# Patient Record
Sex: MALE | Race: BLACK OR AFRICAN AMERICAN | Employment: OTHER | URBAN - METROPOLITAN AREA
[De-identification: names, ages, dates, MRNs, and addresses within clinical notes are randomized per-mention and may not be internally consistent; named-entity substitution may affect disease eponyms.]

---

## 2024-10-02 ENCOUNTER — APPOINTMENT (OUTPATIENT)
Dept: CT IMAGING | Age: 59
DRG: 177 | End: 2024-10-02
Payer: COMMERCIAL

## 2024-10-02 ENCOUNTER — APPOINTMENT (OUTPATIENT)
Dept: GENERAL RADIOLOGY | Age: 59
DRG: 177 | End: 2024-10-02
Payer: COMMERCIAL

## 2024-10-02 ENCOUNTER — HOSPITAL ENCOUNTER (INPATIENT)
Age: 59
LOS: 5 days | Discharge: HOME | DRG: 177 | End: 2024-10-07
Attending: STUDENT IN AN ORGANIZED HEALTH CARE EDUCATION/TRAINING PROGRAM | Admitting: INTERNAL MEDICINE
Payer: COMMERCIAL

## 2024-10-02 DIAGNOSIS — R07.9 CHEST PAIN, UNSPECIFIED TYPE: Primary | ICD-10-CM

## 2024-10-02 DIAGNOSIS — R79.89 ELEVATED TROPONIN: ICD-10-CM

## 2024-10-02 DIAGNOSIS — J18.9 PNEUMONIA DUE TO INFECTIOUS ORGANISM, UNSPECIFIED LATERALITY, UNSPECIFIED PART OF LUNG: ICD-10-CM

## 2024-10-02 PROBLEM — J44.1 COPD EXACERBATION (HCC): Status: ACTIVE | Noted: 2024-10-02

## 2024-10-02 LAB
ALBUMIN SERPL-MCNC: 4.2 G/DL (ref 3.4–5)
ALBUMIN/GLOB SERPL: 1.4 {RATIO} (ref 1.1–2.2)
ALP SERPL-CCNC: 96 U/L (ref 40–129)
ALT SERPL-CCNC: 19 U/L (ref 10–40)
ANION GAP SERPL CALCULATED.3IONS-SCNC: 11 MMOL/L (ref 3–16)
ANISOCYTOSIS BLD QL SMEAR: ABNORMAL
AST SERPL-CCNC: 28 U/L (ref 15–37)
BASE EXCESS BLDV CALC-SCNC: 3.6 MMOL/L
BASOPHILS # BLD: 0 K/UL (ref 0–0.2)
BASOPHILS NFR BLD: 0 %
BILIRUB SERPL-MCNC: 0.8 MG/DL (ref 0–1)
BUN SERPL-MCNC: 13 MG/DL (ref 7–20)
CALCIUM SERPL-MCNC: 9.2 MG/DL (ref 8.3–10.6)
CHLORIDE SERPL-SCNC: 101 MMOL/L (ref 99–110)
CO2 BLDV-SCNC: 31 MMOL/L
CO2 SERPL-SCNC: 26 MMOL/L (ref 21–32)
COHGB MFR BLDV: 1.8 %
CREAT SERPL-MCNC: 1 MG/DL (ref 0.9–1.3)
DEPRECATED RDW RBC AUTO: 16.2 % (ref 12.4–15.4)
EKG ATRIAL RATE: 109 BPM
EKG DIAGNOSIS: NORMAL
EKG P AXIS: 98 DEGREES
EKG P-R INTERVAL: 136 MS
EKG Q-T INTERVAL: 322 MS
EKG QRS DURATION: 86 MS
EKG QTC CALCULATION (BAZETT): 433 MS
EKG R AXIS: 112 DEGREES
EKG T AXIS: 21 DEGREES
EKG VENTRICULAR RATE: 109 BPM
EOSINOPHIL # BLD: 0 K/UL (ref 0–0.6)
EOSINOPHIL NFR BLD: 0 %
FLUAV RNA UPPER RESP QL NAA+PROBE: NEGATIVE
FLUBV AG NPH QL: NEGATIVE
GFR SERPLBLD CREATININE-BSD FMLA CKD-EPI: 88 ML/MIN/{1.73_M2}
GLUCOSE SERPL-MCNC: 98 MG/DL (ref 70–99)
HCO3 BLDV-SCNC: 30 MMOL/L (ref 23–29)
HCT VFR BLD AUTO: 44.8 % (ref 40.5–52.5)
HGB BLD-MCNC: 15.2 G/DL (ref 13.5–17.5)
LYMPHOCYTES # BLD: 0.6 K/UL (ref 1–5.1)
LYMPHOCYTES NFR BLD: 5 %
MAGNESIUM SERPL-MCNC: 2.32 MG/DL (ref 1.8–2.4)
MCH RBC QN AUTO: 28.2 PG (ref 26–34)
MCHC RBC AUTO-ENTMCNC: 34 G/DL (ref 31–36)
MCV RBC AUTO: 83 FL (ref 80–100)
METHGB MFR BLDV: 0.3 %
MONOCYTES # BLD: 1.4 K/UL (ref 0–1.3)
MONOCYTES NFR BLD: 11 %
NEUTROPHILS # BLD: 10.8 K/UL (ref 1.7–7.7)
NEUTROPHILS NFR BLD: 84 %
O2 THERAPY: ABNORMAL
OVALOCYTES BLD QL SMEAR: ABNORMAL
PCO2 BLDV: 49.1 MMHG (ref 40–50)
PH BLDV: 7.39 [PH] (ref 7.35–7.45)
PLATELET # BLD AUTO: 343 K/UL (ref 135–450)
PLATELET BLD QL SMEAR: ADEQUATE
PMV BLD AUTO: 8.8 FL (ref 5–10.5)
PO2 BLDV: 51 MMHG
POTASSIUM SERPL-SCNC: 3.9 MMOL/L (ref 3.5–5.1)
PROT SERPL-MCNC: 7.1 G/DL (ref 6.4–8.2)
RBC # BLD AUTO: 5.39 M/UL (ref 4.2–5.9)
SAO2 % BLDV: 85 %
SARS-COV-2 RDRP RESP QL NAA+PROBE: NOT DETECTED
SLIDE REVIEW: ABNORMAL
SODIUM SERPL-SCNC: 138 MMOL/L (ref 136–145)
TROPONIN, HIGH SENSITIVITY: 18 NG/L (ref 0–22)
TROPONIN, HIGH SENSITIVITY: 22 NG/L (ref 0–22)
TROPONIN, HIGH SENSITIVITY: 25 NG/L (ref 0–22)
TROPONIN, HIGH SENSITIVITY: 33 NG/L (ref 0–22)
TROPONIN, HIGH SENSITIVITY: 34 NG/L (ref 0–22)
TROPONIN, HIGH SENSITIVITY: 35 NG/L (ref 0–22)
WBC # BLD AUTO: 12.8 K/UL (ref 4–11)

## 2024-10-02 PROCEDURE — 2700000000 HC OXYGEN THERAPY PER DAY

## 2024-10-02 PROCEDURE — 6370000000 HC RX 637 (ALT 250 FOR IP): Performed by: INTERNAL MEDICINE

## 2024-10-02 PROCEDURE — 2580000003 HC RX 258: Performed by: INTERNAL MEDICINE

## 2024-10-02 PROCEDURE — 71260 CT THORAX DX C+: CPT

## 2024-10-02 PROCEDURE — 80053 COMPREHEN METABOLIC PANEL: CPT

## 2024-10-02 PROCEDURE — 6370000000 HC RX 637 (ALT 250 FOR IP): Performed by: STUDENT IN AN ORGANIZED HEALTH CARE EDUCATION/TRAINING PROGRAM

## 2024-10-02 PROCEDURE — 85025 COMPLETE CBC W/AUTO DIFF WBC: CPT

## 2024-10-02 PROCEDURE — 96365 THER/PROPH/DIAG IV INF INIT: CPT

## 2024-10-02 PROCEDURE — 93005 ELECTROCARDIOGRAM TRACING: CPT | Performed by: STUDENT IN AN ORGANIZED HEALTH CARE EDUCATION/TRAINING PROGRAM

## 2024-10-02 PROCEDURE — 71045 X-RAY EXAM CHEST 1 VIEW: CPT

## 2024-10-02 PROCEDURE — 87804 INFLUENZA ASSAY W/OPTIC: CPT

## 2024-10-02 PROCEDURE — 1200000000 HC SEMI PRIVATE

## 2024-10-02 PROCEDURE — 94640 AIRWAY INHALATION TREATMENT: CPT

## 2024-10-02 PROCEDURE — 94761 N-INVAS EAR/PLS OXIMETRY MLT: CPT

## 2024-10-02 PROCEDURE — 84484 ASSAY OF TROPONIN QUANT: CPT

## 2024-10-02 PROCEDURE — 2580000003 HC RX 258: Performed by: STUDENT IN AN ORGANIZED HEALTH CARE EDUCATION/TRAINING PROGRAM

## 2024-10-02 PROCEDURE — 2060000000 HC ICU INTERMEDIATE R&B

## 2024-10-02 PROCEDURE — 36415 COLL VENOUS BLD VENIPUNCTURE: CPT

## 2024-10-02 PROCEDURE — 83735 ASSAY OF MAGNESIUM: CPT

## 2024-10-02 PROCEDURE — 99285 EMERGENCY DEPT VISIT HI MDM: CPT

## 2024-10-02 PROCEDURE — 96375 TX/PRO/DX INJ NEW DRUG ADDON: CPT

## 2024-10-02 PROCEDURE — 6360000002 HC RX W HCPCS: Performed by: INTERNAL MEDICINE

## 2024-10-02 PROCEDURE — 6360000004 HC RX CONTRAST MEDICATION: Performed by: STUDENT IN AN ORGANIZED HEALTH CARE EDUCATION/TRAINING PROGRAM

## 2024-10-02 PROCEDURE — 93010 ELECTROCARDIOGRAM REPORT: CPT | Performed by: INTERNAL MEDICINE

## 2024-10-02 PROCEDURE — 6360000002 HC RX W HCPCS: Performed by: STUDENT IN AN ORGANIZED HEALTH CARE EDUCATION/TRAINING PROGRAM

## 2024-10-02 PROCEDURE — 82803 BLOOD GASES ANY COMBINATION: CPT

## 2024-10-02 PROCEDURE — 87635 SARS-COV-2 COVID-19 AMP PRB: CPT

## 2024-10-02 RX ORDER — BENZONATATE 100 MG/1
100 CAPSULE ORAL 3 TIMES DAILY PRN
Status: DISCONTINUED | OUTPATIENT
Start: 2024-10-02 | End: 2024-10-07 | Stop reason: HOSPADM

## 2024-10-02 RX ORDER — LOSARTAN POTASSIUM 25 MG/1
25 TABLET ORAL DAILY
COMMUNITY
Start: 2024-01-25

## 2024-10-02 RX ORDER — BUDESONIDE AND FORMOTEROL FUMARATE DIHYDRATE 160; 4.5 UG/1; UG/1
2 AEROSOL RESPIRATORY (INHALATION) 2 TIMES DAILY
COMMUNITY
Start: 2024-06-24

## 2024-10-02 RX ORDER — BUDESONIDE 0.25 MG/2ML
250 INHALANT ORAL
COMMUNITY
Start: 2024-07-29

## 2024-10-02 RX ORDER — ASPIRIN 81 MG/1
81 TABLET ORAL DAILY
COMMUNITY

## 2024-10-02 RX ORDER — ONDANSETRON 2 MG/ML
4 INJECTION INTRAMUSCULAR; INTRAVENOUS EVERY 6 HOURS PRN
Status: DISCONTINUED | OUTPATIENT
Start: 2024-10-02 | End: 2024-10-07 | Stop reason: HOSPADM

## 2024-10-02 RX ORDER — ALBUTEROL SULFATE 90 UG/1
2 INHALANT RESPIRATORY (INHALATION) EVERY 6 HOURS PRN
COMMUNITY
Start: 2024-08-12

## 2024-10-02 RX ORDER — METHYLPREDNISOLONE SODIUM SUCCINATE 40 MG/ML
40 INJECTION, POWDER, LYOPHILIZED, FOR SOLUTION INTRAMUSCULAR; INTRAVENOUS EVERY 6 HOURS
Status: COMPLETED | OUTPATIENT
Start: 2024-10-02 | End: 2024-10-04

## 2024-10-02 RX ORDER — SODIUM CHLORIDE 0.9 % (FLUSH) 0.9 %
5-40 SYRINGE (ML) INJECTION EVERY 12 HOURS SCHEDULED
Status: DISCONTINUED | OUTPATIENT
Start: 2024-10-02 | End: 2024-10-07 | Stop reason: HOSPADM

## 2024-10-02 RX ORDER — SODIUM CHLORIDE 9 MG/ML
INJECTION, SOLUTION INTRAVENOUS PRN
Status: DISCONTINUED | OUTPATIENT
Start: 2024-10-02 | End: 2024-10-07 | Stop reason: HOSPADM

## 2024-10-02 RX ORDER — ONDANSETRON 4 MG/1
4 TABLET, ORALLY DISINTEGRATING ORAL EVERY 8 HOURS PRN
Status: DISCONTINUED | OUTPATIENT
Start: 2024-10-02 | End: 2024-10-07 | Stop reason: HOSPADM

## 2024-10-02 RX ORDER — CETIRIZINE HYDROCHLORIDE 10 MG/1
10 TABLET ORAL DAILY
COMMUNITY

## 2024-10-02 RX ORDER — ACETAMINOPHEN 650 MG/1
650 SUPPOSITORY RECTAL EVERY 6 HOURS PRN
Status: DISCONTINUED | OUTPATIENT
Start: 2024-10-02 | End: 2024-10-07 | Stop reason: HOSPADM

## 2024-10-02 RX ORDER — ONDANSETRON 2 MG/ML
4 INJECTION INTRAMUSCULAR; INTRAVENOUS ONCE
Status: COMPLETED | OUTPATIENT
Start: 2024-10-02 | End: 2024-10-02

## 2024-10-02 RX ORDER — IOPAMIDOL 755 MG/ML
75 INJECTION, SOLUTION INTRAVASCULAR
Status: COMPLETED | OUTPATIENT
Start: 2024-10-02 | End: 2024-10-02

## 2024-10-02 RX ORDER — ACLIDINIUM BROMIDE 400 UG/1
1 POWDER, METERED RESPIRATORY (INHALATION)
COMMUNITY

## 2024-10-02 RX ORDER — ACETAMINOPHEN 325 MG/1
650 TABLET ORAL EVERY 6 HOURS PRN
Status: DISCONTINUED | OUTPATIENT
Start: 2024-10-02 | End: 2024-10-07 | Stop reason: HOSPADM

## 2024-10-02 RX ORDER — AMLODIPINE BESYLATE 10 MG/1
10 TABLET ORAL DAILY
COMMUNITY

## 2024-10-02 RX ORDER — POLYETHYLENE GLYCOL 3350 17 G/17G
17 POWDER, FOR SOLUTION ORAL DAILY PRN
Status: DISCONTINUED | OUTPATIENT
Start: 2024-10-02 | End: 2024-10-07 | Stop reason: HOSPADM

## 2024-10-02 RX ORDER — LEVOTHYROXINE SODIUM 175 UG/1
1 TABLET ORAL DAILY
COMMUNITY
Start: 2024-08-19

## 2024-10-02 RX ORDER — BUDESONIDE AND FORMOTEROL FUMARATE DIHYDRATE 160; 4.5 UG/1; UG/1
2 AEROSOL RESPIRATORY (INHALATION)
Status: DISCONTINUED | OUTPATIENT
Start: 2024-10-02 | End: 2024-10-07 | Stop reason: HOSPADM

## 2024-10-02 RX ORDER — GUAIFENESIN/DEXTROMETHORPHAN 100-10MG/5
5 SYRUP ORAL EVERY 4 HOURS PRN
Status: DISCONTINUED | OUTPATIENT
Start: 2024-10-02 | End: 2024-10-07 | Stop reason: HOSPADM

## 2024-10-02 RX ORDER — IBUPROFEN 800 MG/1
800 TABLET, FILM COATED ORAL EVERY 8 HOURS PRN
COMMUNITY
Start: 2024-07-01

## 2024-10-02 RX ORDER — CYCLOBENZAPRINE HCL 10 MG
10 TABLET ORAL 3 TIMES DAILY PRN
COMMUNITY

## 2024-10-02 RX ORDER — ASPIRIN 81 MG/1
324 TABLET, CHEWABLE ORAL ONCE
Status: COMPLETED | OUTPATIENT
Start: 2024-10-02 | End: 2024-10-02

## 2024-10-02 RX ORDER — ENOXAPARIN SODIUM 100 MG/ML
30 INJECTION SUBCUTANEOUS 2 TIMES DAILY
Status: DISCONTINUED | OUTPATIENT
Start: 2024-10-02 | End: 2024-10-07 | Stop reason: HOSPADM

## 2024-10-02 RX ORDER — AZITHROMYCIN 500 MG/1
500 TABLET, FILM COATED ORAL DAILY
Status: DISCONTINUED | OUTPATIENT
Start: 2024-10-03 | End: 2024-10-04

## 2024-10-02 RX ORDER — DIPHENHYDRAMINE HYDROCHLORIDE 50 MG/ML
50 INJECTION INTRAMUSCULAR; INTRAVENOUS ONCE
Status: COMPLETED | OUTPATIENT
Start: 2024-10-02 | End: 2024-10-02

## 2024-10-02 RX ORDER — PREDNISONE 20 MG/1
60 TABLET ORAL ONCE
Status: COMPLETED | OUTPATIENT
Start: 2024-10-02 | End: 2024-10-02

## 2024-10-02 RX ORDER — IPRATROPIUM BROMIDE AND ALBUTEROL SULFATE 2.5; .5 MG/3ML; MG/3ML
3 SOLUTION RESPIRATORY (INHALATION)
Status: COMPLETED | OUTPATIENT
Start: 2024-10-02 | End: 2024-10-02

## 2024-10-02 RX ORDER — ESCITALOPRAM OXALATE 10 MG/1
10 TABLET ORAL DAILY
COMMUNITY
Start: 2024-06-04

## 2024-10-02 RX ORDER — SODIUM CHLORIDE 0.9 % (FLUSH) 0.9 %
5-40 SYRINGE (ML) INJECTION PRN
Status: DISCONTINUED | OUTPATIENT
Start: 2024-10-02 | End: 2024-10-07 | Stop reason: HOSPADM

## 2024-10-02 RX ORDER — IPRATROPIUM BROMIDE AND ALBUTEROL SULFATE 2.5; .5 MG/3ML; MG/3ML
1 SOLUTION RESPIRATORY (INHALATION)
Status: DISCONTINUED | OUTPATIENT
Start: 2024-10-02 | End: 2024-10-07 | Stop reason: HOSPADM

## 2024-10-02 RX ORDER — PREDNISONE 20 MG/1
40 TABLET ORAL DAILY
Status: COMPLETED | OUTPATIENT
Start: 2024-10-05 | End: 2024-10-07

## 2024-10-02 RX ADMIN — IPRATROPIUM BROMIDE AND ALBUTEROL SULFATE 3 DOSE: .5; 3 SOLUTION RESPIRATORY (INHALATION) at 10:14

## 2024-10-02 RX ADMIN — AZITHROMYCIN MONOHYDRATE 500 MG: 500 INJECTION, POWDER, LYOPHILIZED, FOR SOLUTION INTRAVENOUS at 13:09

## 2024-10-02 RX ADMIN — IPRATROPIUM BROMIDE AND ALBUTEROL SULFATE 1 DOSE: 2.5; .5 SOLUTION RESPIRATORY (INHALATION) at 20:26

## 2024-10-02 RX ADMIN — PREDNISONE 60 MG: 20 TABLET ORAL at 10:18

## 2024-10-02 RX ADMIN — METHYLPREDNISOLONE SODIUM SUCCINATE 40 MG: 40 INJECTION INTRAMUSCULAR; INTRAVENOUS at 20:30

## 2024-10-02 RX ADMIN — ENOXAPARIN SODIUM 30 MG: 100 INJECTION SUBCUTANEOUS at 20:35

## 2024-10-02 RX ADMIN — DIPHENHYDRAMINE HYDROCHLORIDE 50 MG: 50 INJECTION INTRAMUSCULAR; INTRAVENOUS at 11:06

## 2024-10-02 RX ADMIN — WATER 1000 MG: 1 INJECTION INTRAMUSCULAR; INTRAVENOUS; SUBCUTANEOUS at 13:08

## 2024-10-02 RX ADMIN — SODIUM CHLORIDE, PRESERVATIVE FREE 10 ML: 5 INJECTION INTRAVENOUS at 20:36

## 2024-10-02 RX ADMIN — ASPIRIN 324 MG: 81 TABLET, CHEWABLE ORAL at 10:54

## 2024-10-02 RX ADMIN — ONDANSETRON 4 MG: 2 INJECTION INTRAMUSCULAR; INTRAVENOUS at 13:35

## 2024-10-02 RX ADMIN — Medication 2 PUFF: at 20:33

## 2024-10-02 RX ADMIN — IOPAMIDOL 75 ML: 755 INJECTION, SOLUTION INTRAVENOUS at 11:25

## 2024-10-02 ASSESSMENT — LIFESTYLE VARIABLES
HOW OFTEN DO YOU HAVE A DRINK CONTAINING ALCOHOL: NEVER
HOW MANY STANDARD DRINKS CONTAINING ALCOHOL DO YOU HAVE ON A TYPICAL DAY: PATIENT DOES NOT DRINK

## 2024-10-02 ASSESSMENT — PAIN SCALES - GENERAL: PAINLEVEL_OUTOF10: 6

## 2024-10-02 ASSESSMENT — PAIN - FUNCTIONAL ASSESSMENT: PAIN_FUNCTIONAL_ASSESSMENT: 0-10

## 2024-10-02 ASSESSMENT — PAIN DESCRIPTION - LOCATION: LOCATION: CHEST

## 2024-10-02 ASSESSMENT — PAIN DESCRIPTION - DESCRIPTORS: DESCRIPTORS: TIGHTNESS

## 2024-10-02 NOTE — PROGRESS NOTES
Patient arrived from ED to PCU rm [id number]. VSS on 2LNC, he is A&Ox4. Call light and belongings within reach. Safety precaution in place. Skin assessment completed.     Electronically signed by Mana Baumann RN on 10/2/2024 at 6:25 PM

## 2024-10-02 NOTE — PROGRESS NOTES
4 Eyes Skin Assessment     NAME:  Serg Burnette  YOB: 1965  MEDICAL RECORD NUMBER:  [id number]    The patient is being assessed for  Admission    I agree that at least one RN has performed a thorough Head to Toe Skin Assessment on the patient. ALL assessment sites listed below have been assessed.      Areas assessed by both nurses:    Head, Face, Ears, Shoulders, Back, Chest, Arms, Elbows, Hands, Sacrum. Buttock, Coccyx, Ischium, Legs. Feet and Heels, and Under Medical Devices         Does the Patient have a Wound? No noted wound(s)       Gopi Prevention initiated by RN: No  Wound Care Orders initiated by RN: No    Pressure Injury (Stage 3,4, Unstageable, DTI, NWPT, and Complex wounds) if present, place Wound referral order by RN under : No    New Ostomies, if present place, Ostomy referral order under : No     Nurse 1 eSignature: Electronically signed by Mana Baumann RN on 10/2/24 at 6:26 PM EDT    **SHARE this note so that the co-signing nurse can place an eSignature**    Nurse 2 eSignature: Electronically signed by Odette Sandoval RN on 10/2/24 at 6:47 PM EDT

## 2024-10-02 NOTE — PROGRESS NOTES
Medication Reconciliation    List of medications patient is currently taking is complete.     Source of information: 1. Conversation with patient at bedside                                      2. EPIC records      Notes regarding home medications:   1. Patient reports taking only levothyroxine this morning PTA      Andrea Brady [facility]   10/2/2024  1:38 PM

## 2024-10-02 NOTE — PROGRESS NOTES
Clinical Pharmacy Note  Ceftriaxone Dose Adjustment    Serg Burnette is receiving ceftriaxone for COPD. The dose has been adjusted to 2gm daily per protocol,     Wt Readings from Last 1 Encounters:   10/02/24 105.7 kg (233 lb 0.4 oz)         Ceftriaxone Empiric Dosing Table    Indication Weight < 100 kg** Weight >= 100 kg*   Bacteremia      Non-pneumococcal      Pneumococcal     2 g daily  2 g Q12H    2 g daily  2 g Q12H   Community Acquired PNA      Non- critically ill      Critically ill   1 g daily  2 g daily   2 g daily  2 g daily   CNS Infection 2 g Q12H 2 g Q12H   COPD exacerbation 1 g daily 2 g daily   Diabetic Foot Infection or SSTI 1 g daily 2 g daily   Endocarditis      Enterococcus faecalis (in         combination with ampicillin)        Strep sp., gram-negative         organisms     2 g Q12H      2 g daily   2 g Q12H      2 g daily   Intra-abdominal infection 1 g daily 2 g daily   Osteomyelitis  Discitis  Prosthetic Joint infection    Septic arthritis 2 g daily 2 g daily   Urinary Tract Infection     Uncomplicated     Complicated   1 g daily  2 g daily   2 g daily  2 g daily     Ericka Clemens, [facility], 10/2/2024 6:10 PM

## 2024-10-03 ENCOUNTER — APPOINTMENT (OUTPATIENT)
Age: 59
DRG: 177 | End: 2024-10-03
Attending: INTERNAL MEDICINE
Payer: COMMERCIAL

## 2024-10-03 PROBLEM — R07.9 CHEST PAIN: Status: ACTIVE | Noted: 2024-10-03

## 2024-10-03 LAB
ANION GAP SERPL CALCULATED.3IONS-SCNC: 8 MMOL/L (ref 3–16)
BASOPHILS # BLD: 0 K/UL (ref 0–0.2)
BASOPHILS NFR BLD: 0.2 %
BUN SERPL-MCNC: 16 MG/DL (ref 7–20)
CALCIUM SERPL-MCNC: 8.2 MG/DL (ref 8.3–10.6)
CHLORIDE SERPL-SCNC: 99 MMOL/L (ref 99–110)
CO2 SERPL-SCNC: 29 MMOL/L (ref 21–32)
CREAT SERPL-MCNC: 1 MG/DL (ref 0.9–1.3)
DEPRECATED RDW RBC AUTO: 15.9 % (ref 12.4–15.4)
ECHO AO ROOT DIAM: 2.7 CM
ECHO AO ROOT INDEX: 1.26 CM/M2
ECHO BSA: 2.22 M2
ECHO LA DIAMETER INDEX: 1.53 CM/M2
ECHO LA DIAMETER: 3.3 CM
ECHO LA TO AORTIC ROOT RATIO: 1.22
ECHO LV EDV A2C: 95 ML
ECHO LV EDV A4C: 92 ML
ECHO LV EDV INDEX A4C: 43 ML/M2
ECHO LV EDV NDEX A2C: 44 ML/M2
ECHO LV EF PHYSICIAN: 70 %
ECHO LV EJECTION FRACTION A2C: 77 %
ECHO LV EJECTION FRACTION A4C: 61 %
ECHO LV EJECTION FRACTION BIPLANE: 69 % (ref 55–100)
ECHO LV ESV A2C: 22 ML
ECHO LV ESV A4C: 36 ML
ECHO LV ESV INDEX A2C: 10 ML/M2
ECHO LV ESV INDEX A4C: 17 ML/M2
ECHO LV FRACTIONAL SHORTENING: 55 % (ref 28–44)
ECHO LV INTERNAL DIMENSION DIASTOLE INDEX: 2.47 CM/M2
ECHO LV INTERNAL DIMENSION DIASTOLIC: 5.3 CM (ref 4.2–5.9)
ECHO LV INTERNAL DIMENSION SYSTOLIC INDEX: 1.12 CM/M2
ECHO LV INTERNAL DIMENSION SYSTOLIC: 2.4 CM
ECHO LV IVSD: 0.8 CM (ref 0.6–1)
ECHO LV MASS 2D: 162.1 G (ref 88–224)
ECHO LV MASS INDEX 2D: 75.4 G/M2 (ref 49–115)
ECHO LV POSTERIOR WALL DIASTOLIC: 0.9 CM (ref 0.6–1)
ECHO LV RELATIVE WALL THICKNESS RATIO: 0.34
ECHO RV BASAL DIMENSION: 3.4 CM
ECHO RV INTERNAL DIMENSION: 3 CM
ECHO RV TAPSE: 3.3 CM (ref 1.7–?)
EKG ATRIAL RATE: 76 BPM
EKG DIAGNOSIS: NORMAL
EKG P AXIS: 109 DEGREES
EKG P-R INTERVAL: 152 MS
EKG Q-T INTERVAL: 396 MS
EKG QRS DURATION: 92 MS
EKG QTC CALCULATION (BAZETT): 445 MS
EKG R AXIS: 97 DEGREES
EKG T AXIS: 51 DEGREES
EKG VENTRICULAR RATE: 76 BPM
EOSINOPHIL # BLD: 0 K/UL (ref 0–0.6)
EOSINOPHIL NFR BLD: 0 %
GFR SERPLBLD CREATININE-BSD FMLA CKD-EPI: 88 ML/MIN/{1.73_M2}
GLUCOSE SERPL-MCNC: 148 MG/DL (ref 70–99)
HCT VFR BLD AUTO: 43.6 % (ref 40.5–52.5)
HGB BLD-MCNC: 13.8 G/DL (ref 13.5–17.5)
LYMPHOCYTES # BLD: 0.7 K/UL (ref 1–5.1)
LYMPHOCYTES NFR BLD: 5.9 %
MCH RBC QN AUTO: 27 PG (ref 26–34)
MCHC RBC AUTO-ENTMCNC: 31.8 G/DL (ref 31–36)
MCV RBC AUTO: 85.1 FL (ref 80–100)
MONOCYTES # BLD: 0.5 K/UL (ref 0–1.3)
MONOCYTES NFR BLD: 4.1 %
NEUTROPHILS # BLD: 10.6 K/UL (ref 1.7–7.7)
NEUTROPHILS NFR BLD: 89.8 %
PHOSPHATE SERPL-MCNC: 4.5 MG/DL (ref 2.5–4.9)
PLATELET # BLD AUTO: 321 K/UL (ref 135–450)
PLATELET BLD QL SMEAR: ADEQUATE
PMV BLD AUTO: 8.5 FL (ref 5–10.5)
POTASSIUM SERPL-SCNC: 4.9 MMOL/L (ref 3.5–5.1)
PROCALCITONIN SERPL IA-MCNC: 0.13 NG/ML (ref 0–0.15)
RBC # BLD AUTO: 5.12 M/UL (ref 4.2–5.9)
SLIDE REVIEW: ABNORMAL
SODIUM SERPL-SCNC: 136 MMOL/L (ref 136–145)
TROPONIN, HIGH SENSITIVITY: 16 NG/L (ref 0–22)
WBC # BLD AUTO: 11.8 K/UL (ref 4–11)

## 2024-10-03 PROCEDURE — 94640 AIRWAY INHALATION TREATMENT: CPT

## 2024-10-03 PROCEDURE — 1200000000 HC SEMI PRIVATE

## 2024-10-03 PROCEDURE — 93005 ELECTROCARDIOGRAM TRACING: CPT | Performed by: INTERNAL MEDICINE

## 2024-10-03 PROCEDURE — 84145 PROCALCITONIN (PCT): CPT

## 2024-10-03 PROCEDURE — 97165 OT EVAL LOW COMPLEX 30 MIN: CPT

## 2024-10-03 PROCEDURE — 84484 ASSAY OF TROPONIN QUANT: CPT

## 2024-10-03 PROCEDURE — 6370000000 HC RX 637 (ALT 250 FOR IP): Performed by: INTERNAL MEDICINE

## 2024-10-03 PROCEDURE — 84100 ASSAY OF PHOSPHORUS: CPT

## 2024-10-03 PROCEDURE — 93325 DOPPLER ECHO COLOR FLOW MAPG: CPT | Performed by: INTERNAL MEDICINE

## 2024-10-03 PROCEDURE — 99223 1ST HOSP IP/OBS HIGH 75: CPT | Performed by: INTERNAL MEDICINE

## 2024-10-03 PROCEDURE — 85025 COMPLETE CBC W/AUTO DIFF WBC: CPT

## 2024-10-03 PROCEDURE — 2060000000 HC ICU INTERMEDIATE R&B

## 2024-10-03 PROCEDURE — 97535 SELF CARE MNGMENT TRAINING: CPT

## 2024-10-03 PROCEDURE — 97161 PT EVAL LOW COMPLEX 20 MIN: CPT

## 2024-10-03 PROCEDURE — 2580000003 HC RX 258

## 2024-10-03 PROCEDURE — 93308 TTE F-UP OR LMTD: CPT | Performed by: INTERNAL MEDICINE

## 2024-10-03 PROCEDURE — 93010 ELECTROCARDIOGRAM REPORT: CPT | Performed by: INTERNAL MEDICINE

## 2024-10-03 PROCEDURE — 36415 COLL VENOUS BLD VENIPUNCTURE: CPT

## 2024-10-03 PROCEDURE — 99222 1ST HOSP IP/OBS MODERATE 55: CPT | Performed by: INTERNAL MEDICINE

## 2024-10-03 PROCEDURE — 6360000002 HC RX W HCPCS: Performed by: INTERNAL MEDICINE

## 2024-10-03 PROCEDURE — 97530 THERAPEUTIC ACTIVITIES: CPT

## 2024-10-03 PROCEDURE — 9990000010 HC NO CHARGE VISIT

## 2024-10-03 PROCEDURE — 2700000000 HC OXYGEN THERAPY PER DAY

## 2024-10-03 PROCEDURE — 93308 TTE F-UP OR LMTD: CPT

## 2024-10-03 PROCEDURE — 80048 BASIC METABOLIC PNL TOTAL CA: CPT

## 2024-10-03 PROCEDURE — 2580000003 HC RX 258: Performed by: INTERNAL MEDICINE

## 2024-10-03 PROCEDURE — 94761 N-INVAS EAR/PLS OXIMETRY MLT: CPT

## 2024-10-03 RX ORDER — LOSARTAN POTASSIUM 25 MG/1
25 TABLET ORAL DAILY
Status: DISCONTINUED | OUTPATIENT
Start: 2024-10-03 | End: 2024-10-07 | Stop reason: HOSPADM

## 2024-10-03 RX ORDER — BUDESONIDE AND FORMOTEROL FUMARATE DIHYDRATE 160; 4.5 UG/1; UG/1
2 AEROSOL RESPIRATORY (INHALATION) 2 TIMES DAILY
Status: DISCONTINUED | OUTPATIENT
Start: 2024-10-03 | End: 2024-10-03 | Stop reason: SDUPTHER

## 2024-10-03 RX ORDER — ESCITALOPRAM OXALATE 10 MG/1
10 TABLET ORAL DAILY
Status: DISCONTINUED | OUTPATIENT
Start: 2024-10-03 | End: 2024-10-07 | Stop reason: HOSPADM

## 2024-10-03 RX ORDER — ALBUTEROL SULFATE 90 UG/1
2 INHALANT RESPIRATORY (INHALATION) EVERY 4 HOURS PRN
Status: DISCONTINUED | OUTPATIENT
Start: 2024-10-03 | End: 2024-10-07 | Stop reason: HOSPADM

## 2024-10-03 RX ORDER — WATER 10 ML/10ML
INJECTION INTRAMUSCULAR; INTRAVENOUS; SUBCUTANEOUS
Status: COMPLETED
Start: 2024-10-03 | End: 2024-10-03

## 2024-10-03 RX ORDER — PANTOPRAZOLE SODIUM 40 MG/1
40 TABLET, DELAYED RELEASE ORAL
Status: DISCONTINUED | OUTPATIENT
Start: 2024-10-04 | End: 2024-10-07 | Stop reason: HOSPADM

## 2024-10-03 RX ORDER — CYCLOBENZAPRINE HCL 10 MG
10 TABLET ORAL 3 TIMES DAILY PRN
Status: DISCONTINUED | OUTPATIENT
Start: 2024-10-03 | End: 2024-10-07 | Stop reason: HOSPADM

## 2024-10-03 RX ORDER — ASPIRIN 81 MG/1
81 TABLET ORAL DAILY
Status: DISCONTINUED | OUTPATIENT
Start: 2024-10-03 | End: 2024-10-07 | Stop reason: HOSPADM

## 2024-10-03 RX ORDER — ALBUTEROL SULFATE 90 UG/1
2 INHALANT RESPIRATORY (INHALATION) EVERY 6 HOURS PRN
Status: DISCONTINUED | OUTPATIENT
Start: 2024-10-03 | End: 2024-10-07 | Stop reason: HOSPADM

## 2024-10-03 RX ORDER — AMLODIPINE BESYLATE 10 MG/1
10 TABLET ORAL DAILY
Status: DISCONTINUED | OUTPATIENT
Start: 2024-10-03 | End: 2024-10-07 | Stop reason: HOSPADM

## 2024-10-03 RX ORDER — CETIRIZINE HYDROCHLORIDE 10 MG/1
10 TABLET ORAL DAILY
Status: DISCONTINUED | OUTPATIENT
Start: 2024-10-03 | End: 2024-10-07 | Stop reason: HOSPADM

## 2024-10-03 RX ORDER — BUDESONIDE 0.25 MG/2ML
250 INHALANT ORAL
Status: DISCONTINUED | OUTPATIENT
Start: 2024-10-03 | End: 2024-10-03

## 2024-10-03 RX ADMIN — METHYLPREDNISOLONE SODIUM SUCCINATE 40 MG: 40 INJECTION INTRAMUSCULAR; INTRAVENOUS at 03:44

## 2024-10-03 RX ADMIN — IPRATROPIUM BROMIDE AND ALBUTEROL SULFATE 1 DOSE: 2.5; .5 SOLUTION RESPIRATORY (INHALATION) at 11:58

## 2024-10-03 RX ADMIN — Medication 2 PUFF: at 20:29

## 2024-10-03 RX ADMIN — CEFTRIAXONE SODIUM 2000 MG: 2 INJECTION, POWDER, FOR SOLUTION INTRAMUSCULAR; INTRAVENOUS at 14:47

## 2024-10-03 RX ADMIN — SODIUM CHLORIDE, PRESERVATIVE FREE 10 ML: 5 INJECTION INTRAVENOUS at 08:10

## 2024-10-03 RX ADMIN — CETIRIZINE HYDROCHLORIDE 10 MG: 10 TABLET, FILM COATED ORAL at 10:07

## 2024-10-03 RX ADMIN — ENOXAPARIN SODIUM 30 MG: 100 INJECTION SUBCUTANEOUS at 08:12

## 2024-10-03 RX ADMIN — WATER 1 ML: 1 INJECTION INTRAMUSCULAR; INTRAVENOUS; SUBCUTANEOUS at 08:10

## 2024-10-03 RX ADMIN — METHYLPREDNISOLONE SODIUM SUCCINATE 40 MG: 40 INJECTION INTRAMUSCULAR; INTRAVENOUS at 19:48

## 2024-10-03 RX ADMIN — LEVOTHYROXINE SODIUM 175 MCG: 0.12 TABLET ORAL at 10:07

## 2024-10-03 RX ADMIN — LOSARTAN POTASSIUM 25 MG: 25 TABLET, FILM COATED ORAL at 10:07

## 2024-10-03 RX ADMIN — METHYLPREDNISOLONE SODIUM SUCCINATE 40 MG: 40 INJECTION INTRAMUSCULAR; INTRAVENOUS at 14:28

## 2024-10-03 RX ADMIN — METHYLPREDNISOLONE SODIUM SUCCINATE 40 MG: 40 INJECTION INTRAMUSCULAR; INTRAVENOUS at 08:09

## 2024-10-03 RX ADMIN — IPRATROPIUM BROMIDE AND ALBUTEROL SULFATE 1 DOSE: 2.5; .5 SOLUTION RESPIRATORY (INHALATION) at 15:00

## 2024-10-03 RX ADMIN — Medication 2 PUFF: at 08:15

## 2024-10-03 RX ADMIN — ENOXAPARIN SODIUM 30 MG: 100 INJECTION SUBCUTANEOUS at 21:37

## 2024-10-03 RX ADMIN — IPRATROPIUM BROMIDE AND ALBUTEROL SULFATE 1 DOSE: 2.5; .5 SOLUTION RESPIRATORY (INHALATION) at 08:15

## 2024-10-03 RX ADMIN — SODIUM CHLORIDE, PRESERVATIVE FREE 10 ML: 5 INJECTION INTRAVENOUS at 19:50

## 2024-10-03 RX ADMIN — ASPIRIN 81 MG: 81 TABLET, COATED ORAL at 10:07

## 2024-10-03 RX ADMIN — AZITHROMYCIN 500 MG: 500 TABLET, FILM COATED ORAL at 08:07

## 2024-10-03 RX ADMIN — CYCLOBENZAPRINE 10 MG: 10 TABLET, FILM COATED ORAL at 10:12

## 2024-10-03 RX ADMIN — AMLODIPINE BESYLATE 10 MG: 10 TABLET ORAL at 10:07

## 2024-10-03 RX ADMIN — ALBUTEROL SULFATE 2 PUFF: 90 AEROSOL, METERED RESPIRATORY (INHALATION) at 20:29

## 2024-10-03 RX ADMIN — CYCLOBENZAPRINE 10 MG: 10 TABLET, FILM COATED ORAL at 21:40

## 2024-10-03 RX ADMIN — POLYETHYLENE GLYCOL 3350 17 G: 17 POWDER, FOR SOLUTION ORAL at 19:48

## 2024-10-03 RX ADMIN — TIOTROPIUM BROMIDE INHALATION SPRAY 2 PUFF: 3.12 SPRAY, METERED RESPIRATORY (INHALATION) at 11:59

## 2024-10-03 ASSESSMENT — PAIN DESCRIPTION - LOCATION: LOCATION: BACK

## 2024-10-03 ASSESSMENT — PAIN SCALES - GENERAL
PAINLEVEL_OUTOF10: 1
PAINLEVEL_OUTOF10: 7
PAINLEVEL_OUTOF10: 0

## 2024-10-03 ASSESSMENT — PAIN DESCRIPTION - DESCRIPTORS: DESCRIPTORS: ACHING;TIGHTNESS

## 2024-10-03 ASSESSMENT — PAIN DESCRIPTION - ORIENTATION: ORIENTATION: LOWER

## 2024-10-03 NOTE — PROGRESS NOTES
Comprehensive Nutrition Assessment    Type and Reason for Visit:  Initial, Positive Nutrition Screen (weight loss, decreased appetite)    Nutrition Recommendations/Plan:   Regular diet  Ensure with meals-patient willing to try  Will monitor nutritional adequacy, nutrition-related labs, weights, BMs, and clinical progress      Malnutrition Assessment:  Malnutrition Status:  No malnutrition (10/03/24 1317)    Context:  Acute Illness     Findings of the 6 clinical characteristics of malnutrition:  Energy Intake:  Mild decrease in energy intake (Comment)  Weight Loss:  No significant weight loss     Body Fat Loss:  No significant body fat loss     Muscle Mass Loss:  No significant muscle mass loss    Fluid Accumulation:  Unable to assess     Strength:  Not Performed    Nutrition Assessment:    Patient admitted with COPD exacerbation, on 1 liter nasal cannula.  Currently on a regular diet.  Appetite fair.  Agreeable to Ensure protein shakes with meals to prevent nutrition decline.  Weight at home had been fluctuating per patient.    Nutrition Related Findings:    wife does the cooking at home; no chewing or swallowing problems Wound Type: None       Current Nutrition Intake & Therapies:    Average Meal Intake: 51-75%, 26-50%  Average Supplements Intake: Unable to assess  ADULT DIET; Regular  ADULT ORAL NUTRITION SUPPLEMENT; Breakfast, Lunch, Dinner; Standard High Calorie/High Protein Oral Supplement    Anthropometric Measures:  Height: 167.6 cm (5' 6\")  Ideal Body Weight (IBW): 142 lbs (65 kg)       Current Body Weight: 107.3 kg (236 lb 8.9 oz),   IBW. Weight Source: Bed Scale  Current BMI (kg/m2): 38.2                          BMI Categories: Obese Class 2 (BMI 35.0 -39.9)    Estimated Daily Nutrient Needs:  Energy Requirements Based On: Kcal/kg  Weight Used for Energy Requirements: Ideal  Energy (kcal/day): 8066-3338 (28-30 kcal/64.5 kg)     Protein (g/day):  (1.5-1.8 g/64.5 kg)  Method Used for Fluid  Requirements: 1 ml/kcal  Fluid (ml/day):      Nutrition Diagnosis:   Increased nutrient needs related to increase demand for energy/nutrients, impaired respiratory function as evidenced by poor intake prior to admission (decreased appetite)    Nutrition Interventions:   Food and/or Nutrient Delivery: Continue Current Diet, Start Oral Nutrition Supplement  Nutrition Education/Counseling:  (monitor need)  Coordination of Nutrition Care: Continue to monitor while inpatient       Goals:     Goals: PO intake 50% or greater       Nutrition Monitoring and Evaluation:      Food/Nutrient Intake Outcomes: Food and Nutrient Intake, Supplement Intake  Physical Signs/Symptoms Outcomes: Biochemical Data, Nutrition Focused Physical Findings, Weight    Discharge Planning:    Too soon to determine     TOSIN CONKLIN RD, LD  Contact: Office: [phone]; Alhaji: [phone]

## 2024-10-03 NOTE — PLAN OF CARE
Problem: Discharge Planning  Goal: Discharge to home or other facility with appropriate resources  Outcome: Progressing  Flowsheets (Taken 10/2/2024 2042)  Discharge to home or other facility with appropriate resources:   Identify barriers to discharge with patient and caregiver   Identify discharge learning needs (meds, wound care, etc)   Refer to discharge planning if patient needs post-hospital services based on physician order or complex needs related to functional status, cognitive ability or social support system   Arrange for needed discharge resources and transportation as appropriate   Arrange for interpreters to assist at discharge as needed     Problem: Pain  Goal: Verbalizes/displays adequate comfort level or baseline comfort level  Outcome: Progressing     Problem: Safety - Adult  Goal: Free from fall injury  Outcome: Progressing

## 2024-10-03 NOTE — PROGRESS NOTES
applicable  Referring Practitioner: Otilia Bernabe MD  Referral Date : 10/02/24  Diagnosis: COPD exacerbation  Follows Commands: Within Functional Limits  Subjective  Subjective: Pleasant and agreeable to evaluation.         Social/Functional History  Social/Functional History  Lives With: Spouse  Type of Home: Apartment  Home Layout: One level  Home Access: Stairs to enter with rails  Entrance Stairs - Number of Steps: 5-6  Bathroom Shower/Tub: Tub/Shower unit  Bathroom Toilet: Handicap height  Bathroom Equipment: Grab bars in shower  Home Equipment: Walker - Rolling, Cane  Has the patient had two or more falls in the past year or any fall with injury in the past year?: No  ADL Assistance:  (Spouse assists with ADLs)  Ambulation Assistance: Independent  Transfer Assistance: Independent  Active : Yes    Vision/Hearing  Vision  Vision: Within Functional Limits  Hearing  Hearing: Within functional limits      Cognition   Orientation  Overall Orientation Status: Within Functional Limits    Objective    AROM RLE (degrees)  RLE AROM: WFL  AROM LLE (degrees)  LLE AROM : WFL    Strength RLE  Strength RLE: WFL  Strength LLE  Strength LLE: WFL    Bed mobility  Supine to Sit: Independent  Sit to Supine: Independent    Transfers  Sit to Stand: Supervision  Stand to Sit: Supervision    Ambulation  Surface: Level tile  Device: No Device  Assistance: Stand by assistance  Quality of Gait: Fast pace, no device, significantly SOB with activity.  SPO2 to mid 80s on room air.  Pt returned to 2 L.  Gait Deviations: Shuffles     Balance  Comments: Pt able to maintain sitting balance EOB (I).  Able to maintain standing balance without device, supervision.    AM-PAC - Mobility    AM-PAC Basic Mobility - Inpatient   How much help is needed turning from your back to your side while in a flat bed without using bedrails?: None  How much help is needed moving from lying on your back to sitting on the side of a flat bed without

## 2024-10-03 NOTE — H&P
Hospital Medicine History & Physical      PCP: Jessica Lange MD    Date of Admission: 10/2/2024    Date of Service: Pt seen/examined on 10/2/24 and Admitted to Inpatient     Chief Complaint: SOB/CP    History Of Present Illness:    The patient is a 58 y.o. male who presents to [facility] with CP/SOB.  Patient has a past medical history of COPD and asthma, states over the last couple days he has had intermittent chest pain and shortness of breath, substernal location, pressure-like,  associated with cough has been nonproductive.       Past Medical History:        Diagnosis Date    COPD (chronic obstructive pulmonary disease) (HCC)     Hyperlipidemia     Hypertension        Past Surgical History:    History reviewed. No pertinent surgical history.    Medications Prior to Admission:    Prior to Admission medications    Medication Sig Start Date End Date Taking? Authorizing Provider   JAVIER MILLS 400 MCG/ACT AEPB inhaler Inhale 1 puff into the lungs in the morning and 1 puff in the evening.   Yes Bronwyn Bal MD   albuterol sulfate HFA (PROVENTIL;VENTOLIN;PROAIR) 108 (90 Base) MCG/ACT inhaler Inhale 2 puffs into the lungs every 6 hours as needed 8/12/24  Yes ProviderBronwyn MD   amLODIPine (NORVASC) 10 MG tablet Take 1 tablet by mouth daily   Yes Bronwyn Bal MD   aspirin 81 MG EC tablet Take 1 tablet by mouth daily   Yes Bronwyn Bal MD   budesonide (PULMICORT) 0.25 MG/2ML nebulizer suspension Inhale 2 mLs into the lungs in the morning and 2 mLs in the evening. 7/29/24  Yes Bronwyn Bal MD   SYMBICORT 160-4.5 MCG/ACT AERO Inhale 2 puffs into the lungs 2 times daily 6/24/24  Yes Bronwyn Bal MD   cetirizine (ZYRTEC) 10 MG tablet Take 1 tablet by mouth daily   Yes Bronwyn Bal MD   escitalopram (LEXAPRO)

## 2024-10-03 NOTE — PROGRESS NOTES
Occupational Therapy Attempt  Serg Burnette  10/3/2024  [id number]    Spoke w/ RN, defer therapy at this time d/t pt having difficulty breathing. Will cont to monitor and follow up if pt status improves and therapy schedule allows.     Electronically signed by ELBERT Davis/L [id number] on 10/3/24 at 8:44 AM EDT      Pt attempted again, but is currently off the unit for ECHO.     Electronically signed by ELBERT Davis/L [id number] on 10/3/24 at 1:38 PM EDT

## 2024-10-03 NOTE — PROGRESS NOTES
Occupational Therapy  Facility/Department: [address] PROGRESSIVE CARE  Occupational Therapy Initial Assessment    Name: Serg Burnette  : 1965  MRN: [id number]  Date of Service: 10/3/2024    Discharge Recommendations:  Home with Home health OT, Home with assist PRN  OT Equipment Recommendations  Equipment Needed: Yes  Other: TTB?  Serg Burnette scored a 20/24 on the AM-PAC ADL Inpatient form. Current research shows that an AM-PAC score of 18 or greater is typically associated with a discharge to the patient's home setting. Based on the patient's AM-PAC score, and their current ADL deficits, it is recommended that the patient have 2-3 sessions per week of Occupational Therapy at d/c to increase the patient's independence.  At this time, this patient demonstrates the endurance and safety to discharge home with HHOT and a follow up treatment frequency of 2-3x/wk.   Please see assessment section for further patient specific details.    If patient discharges prior to next session this note will serve as a discharge summary.  Please see below for the latest assessment towards goals.       Patient Diagnosis(es): The primary encounter diagnosis was Chest pain, unspecified type. Diagnoses of Pneumonia due to infectious organism, unspecified laterality, unspecified part of lung and Elevated troponin were also pertinent to this visit.  Past Medical History:  has a past medical history of COPD (chronic obstructive pulmonary disease) (HCC), Hyperlipidemia, and Hypertension.  Past Surgical History:  has no past surgical history on file.    Treatment Diagnosis: decreased fxl transfers/mob and ADL status      Assessment  Performance deficits / Impairments: Decreased functional mobility ;Decreased ADL status;Decreased endurance  Assessment: Pt is a 58 y.o. M admitted for COPD exacerbation. PTA, he reported being Ind w/ short fxl mob and mostly Ind w/ self care (intermittent assist PRN). Today- he is functioning below his  baseline as he completed bed mob w/ Ind, fxl transfers w/ supervision, and fxl mob and BR ADLs w/ SBA. Pt is anticipated to require up to SBA for full ADLs. He is most limited by his endurance. He will cont to benefit from being seen on acute to address his deficits and maximize his level of Ind. Anticipate safer return home w/ assist PRN and HHOT post d/c from here.  Treatment Diagnosis: decreased fxl transfers/mob and ADL status  Prognosis: Good  Decision Making: Low Complexity  History: Per H&P \"The patient is a 58 y.o. male who presents to [facility] with CP/SOB. Patient has a past medical history of COPD and asthma, states over the last couple days he has had intermittent chest pain and shortness of breath, substernal location, pressure-like, associated with cough has been nonproductive.\"  Assistance / Modification: SBA w/o device  REQUIRES OT FOLLOW-UP: Yes  Activity Tolerance  Activity Tolerance: Patient Tolerated treatment well;Patient limited by fatigue  Activity Tolerance Comments: RT present in the room, pt w/ O2 NC upon arrival. Completed short fxl mob and toileting on RA and desatting into mid-high 80s. Pt placed back on O2 and able to recover to 94% w/ seated rest break EOB     Plan  Occupational Therapy Plan  Times Per Week: 2-3  Current Treatment Recommendations: Strengthening, Balance training, Functional mobility training, Safety education & training, Self-Care / ADL, Home management training    Restrictions  Restrictions/Precautions  Restrictions/Precautions: Fall Risk    Subjective  General  Chart Reviewed: Yes  Patient assessed for rehabilitation services?: Yes  Additional Pertinent Hx: Per H&P \"The patient is a 58 y.o. male who presents to [facility] with CP/SOB.  Patient has a past medical history of COPD and asthma, states over the last couple days he has had intermittent chest pain and shortness of breath, substernal location, pressure-like,  associated with cough has been

## 2024-10-03 NOTE — PROGRESS NOTES
Physical Therapy  Attempt    Pt out of room for procedure.     Electronically signed by Scott Keene, PT [id number] on 10/3/2024 at 1:44 PM

## 2024-10-03 NOTE — CONSULTS
pulmonary  embolism.  Main pulmonary artery is normal in caliber.     Mediastinum: No evidence of suspicious mediastinal lymphadenopathy.  Partially calcified subcarinal left hilar lymph nodes are present, sequelae  from prior granulomatous disease.  The heart and pericardium demonstrate no  acute abnormality.  There is no acute abnormality of the thoracic aorta.     Lungs/pleura: Mild apical predominant emphysematous changes noted.  There is  a mild burden of patchy tree in bud opacities along the base of the left  lower lobe.  No evidence of pleural effusion or pneumothorax.     Upper Abdomen: Limited images of the upper abdomen are unremarkable.     Soft Tissues/Bones: No acute bone or soft tissue abnormality.       10/3/2024 ECG  Sinus tachycardia  Left posterior fascicular block    10/3/2024 Limited TTE    Left Ventricle: Normal left ventricular systolic function. EF by visual approximation is 70%. Left ventricle size is normal. Normal wall thickness. Normal wall motion.    Right Ventricle: Right ventricle size is normal. Normal systolic function.    Tricuspid Valve: Trace regurgitation.    HS Troponin  22, 18, 16    Tele: SR no events     Impression/Recommendations    Mr. Serg Burnette is a 58 y.o. male patient    Chest pressure, noncardiac   COPD, very severe, with acute exacerbation  Hypertension  Hyperlipidemia  Obesity  Former tobacco, quit 2022      Clinical presentation in line with pulmonary exacerbation. CTA negative for PE or signs of PH.   Negative cardiac workup inpatient includes ECG, biomarkers, limited echo. Previous negative ischemic workup.   No change to home medications for ASCVD risk factor control.  Mr. Burnette would like to establish Pulmonology care here with Dr. Culver.       Thank you for allowing me to participate in the care of your patient. Please do not hesitate to call.     Blanca Lewis DO, FACC  Interventional Cardiology      [facility]  o:  [phone]  [address], [address]  [place] [address]      NOTE:  This report was transcribed using voice recognition software.  Every effort was made to ensure accuracy; however, inadvertent computerized transcription errors may be present.

## 2024-10-03 NOTE — PROGRESS NOTES
Pt refusing labs to be drawn. Pt states \"I haven't slept since I've been here and I'm tired of everyone waking me up.Don't make me leave.\" Education provided on labs being drawn with no success. Labs rescheduled at a later time.

## 2024-10-03 NOTE — CONSULTS
REASON FOR CONSULTATION/CC: COPD exacerbation      Consult at request of Otilia Bernabe MD     PCP: Jessica Lange MD  Established Pulmonologist: [place]    Chief Complaint   Patient presents with    Illness     Cough, chills, headache, body aches, diaphoresis, SOB on exertion and while lying down x 3 days. Hx COPD, asthma. Denies cardic hx. 93% RA       HISTORY OF PRESENT ILLNESS: Serg Burnette is a 58 y.o. year old male with a history of very severe COPD FEV1 24% who presents with shortness of breath.  Associated cough.  Symptoms progressively worsening.  On 1 L of oxygen during my visit.  He states he is normally not on oxygen.  He states he does not really feel any better or worse than on arrival.  He does think the nebulizers are helping some.  And he does think that there is associated wheezing that is also improved.  He reports his wife has had a URI-like syndrome      Past Medical History:   Diagnosis Date    COPD (chronic obstructive pulmonary disease) (HCC)     Hyperlipidemia     Hypertension          History reviewed. No pertinent surgical history.    Family Hx  family history is not on file.  Family is reviewed with patient, pertinent positive HPI  Social Hx   reports that he has quit smoking. He does not have any smokeless tobacco history on file.    Scheduled Meds:   amLODIPine  10 mg Oral Daily    aspirin  81 mg Oral Daily    cetirizine  10 mg Oral Daily    escitalopram  10 mg Oral Daily    levothyroxine  175 mcg Oral Daily    losartan  25 mg Oral Daily    [START ON 10/4/2024] pantoprazole  40 mg Oral QAM AC    tiotropium  2 puff Inhalation Daily RT    sodium chloride flush  5-40 mL IntraVENous 2 times per day    enoxaparin  30 mg SubCUTAneous BID    azithromycin  500 mg Oral Daily    ipratropium 0.5 mg-albuterol 2.5 mg  1 Dose Inhalation Q4H WA RT    methylPREDNISolone  40 mg IntraVENous Q6H    Followed by    [START ON 10/5/2024] predniSONE  40 mg Oral Daily    cefTRIAXone (ROCEPHIN) IV

## 2024-10-03 NOTE — PROGRESS NOTES
Hospitalist Progress Note      PCP: Jessica Lange MD    Date of Admission: 10/2/2024    Subjective: cont to feel SOB, slightly better, but cont to have CP on and off    Medications:  Reviewed    Infusion Medications    sodium chloride       Scheduled Medications    amLODIPine  10 mg Oral Daily    aspirin  81 mg Oral Daily    cetirizine  10 mg Oral Daily    escitalopram  10 mg Oral Daily    levothyroxine  175 mcg Oral Daily    losartan  25 mg Oral Daily    [START ON 10/4/2024] pantoprazole  40 mg Oral QAM AC    tiotropium  2 puff Inhalation Daily RT    sodium chloride flush  5-40 mL IntraVENous 2 times per day    enoxaparin  30 mg SubCUTAneous BID    azithromycin  500 mg Oral Daily    ipratropium 0.5 mg-albuterol 2.5 mg  1 Dose Inhalation Q4H WA RT    methylPREDNISolone  40 mg IntraVENous Q6H    Followed by    [START ON 10/5/2024] predniSONE  40 mg Oral Daily    cefTRIAXone (ROCEPHIN) IV  2,000 mg IntraVENous Q24H    cefTRIAXone (ROCEPHIN) IV  1,000 mg IntraVENous Once    budesonide-formoterol  2 puff Inhalation BID RT     PRN Meds: albuterol sulfate HFA, albuterol sulfate HFA, cyclobenzaprine, sodium chloride flush, sodium chloride, ondansetron **OR** ondansetron, polyethylene glycol, acetaminophen **OR** acetaminophen, guaiFENesin-dextromethorphan, benzonatate, perflutren lipid microspheres      Intake/Output Summary (Last 24 hours) at 10/3/2024 1719  Last data filed at 10/3/2024 0600  Gross per 24 hour   Intake 120 ml   Output --   Net 120 ml       Physical Exam Performed:    BP (!) 159/98   Pulse 96   Temp 98.6 °F (37 °C) (Oral)   Resp 14   Ht 1.676 m (5' 6\")   Wt 107.3 kg (236 lb 8.9 oz)   SpO2 91%   BMI 38.18 kg/m²     General appearance: NAD  Lungs: diminished b/l  Heart: Regular rate and rhythm with Normal S1/S2 without murmurs  Abdomen: Soft, non-tender or non-distended without rigidity or guarding and positive bowel sounds  Extremities: No clubbing, cyanosis, or edema bilaterally.

## 2024-10-03 NOTE — PLAN OF CARE
Problem: Discharge Planning  Goal: Discharge to home or other facility with appropriate resources  10/3/2024 0820 by Amena Swan, RN  Outcome: Progressing     Problem: Pain  Goal: Verbalizes/displays adequate comfort level or baseline comfort level  10/3/2024 0820 by Amena Swan, RN  Outcome: Progressing     Problem: Safety - Adult  Goal: Free from fall injury  10/3/2024 0820 by Amena Swan, RN  Outcome: Progressing

## 2024-10-03 NOTE — PROGRESS NOTES
Respiratory notified due to patients complaint of SOB. Dr Otilia Bernabe notified for home medications per pt request. Electronically signed by Amena Swan RN on 10/3/2024 at 8:17 AM

## 2024-10-03 NOTE — PROGRESS NOTES
Pt rested in bed during this shift. Pt continued to refuse labs till the morning but no issues with medications or vials. Pt laying in bed at this moment call light within reach.    Electronically signed by Yunier Boykin RN on 10/3/2024 at 5:56 AM

## 2024-10-04 LAB
ORGANISM: ABNORMAL
ORGANISM: ABNORMAL
REPORT: NORMAL
RESP PATH DNA+RNA PNL L RESP NAA+NON-PRB: ABNORMAL
RESP PATH DNA+RNA PNL L RESP NAA+NON-PRB: ABNORMAL

## 2024-10-04 PROCEDURE — 6370000000 HC RX 637 (ALT 250 FOR IP): Performed by: INTERNAL MEDICINE

## 2024-10-04 PROCEDURE — 94761 N-INVAS EAR/PLS OXIMETRY MLT: CPT

## 2024-10-04 PROCEDURE — 87070 CULTURE OTHR SPECIMN AEROBIC: CPT

## 2024-10-04 PROCEDURE — 1200000000 HC SEMI PRIVATE

## 2024-10-04 PROCEDURE — 2580000003 HC RX 258: Performed by: INTERNAL MEDICINE

## 2024-10-04 PROCEDURE — 6360000002 HC RX W HCPCS: Performed by: INTERNAL MEDICINE

## 2024-10-04 PROCEDURE — 87633 RESP VIRUS 12-25 TARGETS: CPT

## 2024-10-04 PROCEDURE — 94669 MECHANICAL CHEST WALL OSCILL: CPT

## 2024-10-04 PROCEDURE — 94640 AIRWAY INHALATION TREATMENT: CPT

## 2024-10-04 PROCEDURE — 2060000000 HC ICU INTERMEDIATE R&B

## 2024-10-04 PROCEDURE — 99233 SBSQ HOSP IP/OBS HIGH 50: CPT | Performed by: INTERNAL MEDICINE

## 2024-10-04 PROCEDURE — 2700000000 HC OXYGEN THERAPY PER DAY

## 2024-10-04 RX ORDER — GUAIFENESIN 600 MG/1
600 TABLET, EXTENDED RELEASE ORAL 2 TIMES DAILY
Status: DISCONTINUED | OUTPATIENT
Start: 2024-10-04 | End: 2024-10-07 | Stop reason: HOSPADM

## 2024-10-04 RX ORDER — ALPRAZOLAM 0.25 MG
0.25 TABLET ORAL 2 TIMES DAILY PRN
Status: DISCONTINUED | OUTPATIENT
Start: 2024-10-04 | End: 2024-10-07 | Stop reason: HOSPADM

## 2024-10-04 RX ADMIN — ENOXAPARIN SODIUM 30 MG: 100 INJECTION SUBCUTANEOUS at 08:50

## 2024-10-04 RX ADMIN — ENOXAPARIN SODIUM 30 MG: 100 INJECTION SUBCUTANEOUS at 20:13

## 2024-10-04 RX ADMIN — Medication 2 PUFF: at 20:44

## 2024-10-04 RX ADMIN — ALPRAZOLAM 0.25 MG: 0.25 TABLET ORAL at 13:24

## 2024-10-04 RX ADMIN — CEFTRIAXONE SODIUM 2000 MG: 2 INJECTION, POWDER, FOR SOLUTION INTRAMUSCULAR; INTRAVENOUS at 13:30

## 2024-10-04 RX ADMIN — CETIRIZINE HYDROCHLORIDE 10 MG: 10 TABLET, FILM COATED ORAL at 08:46

## 2024-10-04 RX ADMIN — SODIUM CHLORIDE, PRESERVATIVE FREE 10 ML: 5 INJECTION INTRAVENOUS at 20:14

## 2024-10-04 RX ADMIN — LEVOTHYROXINE SODIUM 175 MCG: 0.12 TABLET ORAL at 05:55

## 2024-10-04 RX ADMIN — Medication 2 PUFF: at 09:02

## 2024-10-04 RX ADMIN — GUAIFENESIN 600 MG: 600 TABLET ORAL at 12:24

## 2024-10-04 RX ADMIN — LOSARTAN POTASSIUM 25 MG: 25 TABLET, FILM COATED ORAL at 08:46

## 2024-10-04 RX ADMIN — GUAIFENESIN 600 MG: 600 TABLET ORAL at 20:12

## 2024-10-04 RX ADMIN — PANTOPRAZOLE SODIUM 40 MG: 40 TABLET, DELAYED RELEASE ORAL at 05:55

## 2024-10-04 RX ADMIN — ASPIRIN 81 MG: 81 TABLET, COATED ORAL at 08:46

## 2024-10-04 RX ADMIN — AMLODIPINE BESYLATE 10 MG: 10 TABLET ORAL at 08:47

## 2024-10-04 RX ADMIN — SODIUM CHLORIDE, PRESERVATIVE FREE 10 ML: 5 INJECTION INTRAVENOUS at 08:42

## 2024-10-04 RX ADMIN — METHYLPREDNISOLONE SODIUM SUCCINATE 40 MG: 40 INJECTION INTRAMUSCULAR; INTRAVENOUS at 03:49

## 2024-10-04 RX ADMIN — METHYLPREDNISOLONE SODIUM SUCCINATE 40 MG: 40 INJECTION INTRAMUSCULAR; INTRAVENOUS at 13:26

## 2024-10-04 RX ADMIN — AZITHROMYCIN 500 MG: 500 TABLET, FILM COATED ORAL at 08:47

## 2024-10-04 RX ADMIN — POLYETHYLENE GLYCOL 3350 17 G: 17 POWDER, FOR SOLUTION ORAL at 20:13

## 2024-10-04 RX ADMIN — METHYLPREDNISOLONE SODIUM SUCCINATE 40 MG: 40 INJECTION INTRAMUSCULAR; INTRAVENOUS at 08:41

## 2024-10-04 NOTE — PLAN OF CARE
Problem: Discharge Planning  Goal: Discharge to home or other facility with appropriate resources  10/4/2024 0933 by Amena Swan, RN  Outcome: Progressing     Problem: Pain  Goal: Verbalizes/displays adequate comfort level or baseline comfort level  10/4/2024 0933 by Amena Swan, RN  Outcome: Progressing     Problem: Safety - Adult  Goal: Free from fall injury  10/4/2024 0933 by Amena Swan, RN  Outcome: Progressing     Problem: Nutrition Deficit:  Goal: Optimize nutritional status  10/4/2024 0933 by Amena Swan, RN  Outcome: Progressing

## 2024-10-04 NOTE — CARE COORDINATION
Spoke to [facility], says they are in network with patient's insurance and can provide oxygen at discharge.  Would need a home O2 eval & DME oxygen order & progress note from MD stating medical necessity if qualifies.     Electronically signed by Sadia Salas RN Case Management on 10/4/2024 at 11:40 AM

## 2024-10-04 NOTE — CARE COORDINATION
Case Management Assessment  Initial Evaluation    Date/Time of Evaluation: 10/4/2024 10:46 AM  Assessment Completed by: Sadia Salas RN    If patient is discharged prior to next notation, then this note serves as note for discharge by case management.    Patient Name: Serg Burnette                   YOB: 1965  Diagnosis: COPD exacerbation (HCC) [J44.1]  Chest pain, unspecified type [R07.9]                   Date / Time: 10/2/2024  9:00 AM    Patient Admission Status: Inpatient   Readmission Risk (Low < 19, Mod (19-27), High > 27): Readmission Risk Score: 7.6    Current PCP: Catherine Mims APRN - CNP  PCP verified by CM? Yes (Catherine Mims NP)    Chart Reviewed: Yes      History Provided by: Patient  Patient Orientation: Alert and Oriented    Patient Cognition: Alert    Hospitalization in the last 30 days (Readmission):  No    If yes, Readmission Assessment in  Navigator will be completed.    Advance Directives:      Code Status: Full Code   Patient's Primary Decision Maker is: Legal Next of Kin      Discharge Planning:    Patient lives with: Spouse/Significant Other Type of Home: Apartment  Primary Care Giver: Self  Patient Support Systems include: Spouse/Significant Other   Current Financial resources: Medicare, Medicaid  Current community resources: None  Current services prior to admission: Durable Medical Equipment            Current DME: Walker            Type of Home Care services:  None    ADLS  Prior functional level: Assistance with the following:, Cooking, Housework, Shopping  Current functional level: Assistance with the following:, Toileting, Mobility    PT AM-PAC: 20 /24  OT AM-PAC: 20 /24    Family can provide assistance at DC: Yes  Would you like Case Management to discuss the discharge plan with any other family members/significant others, and if so, who? Yes (wife at bedside)  Plans to Return to Present Housing: Yes  Other Identified Issues/Barriers to RETURNING to  current housing: limited endurance  Potential Assistance needed at discharge: Durable Medical Equipment            Potential DME: Oxygen Therapy (Comment) (watch for home O2 needs)  Patient expects to discharge to: Apartment  Plan for transportation at discharge: Family    Financial    Payor: [facility] PLAN / Plan: ADVANTAGE [employer] REPLACEMENT / Product Type: *No Product type* /     Does insurance require precert for SNF: Yes    Potential assistance Purchasing Medications: No  Meds-to-Beds request:        [facility]/pharmacy #[id number] - [place], [place] - [address] - P [phone] - F [phone]  [address]  [facility] [address]  Phone: [phone] Fax: [phone]      Notes:    Factors facilitating achievement of predicted outcomes: Family support, Cooperative, and Pleasant    Barriers to discharge: Decreased endurance    Additional Case Management Notes: Patient will return home with spouse. Declines needing home care. Patient saying he needs an aide at home & also asks how his spouse can be a paid caregiver. Patient says he has both Medicare & Medicaid. Advised patient to reach out to his insurance. I said I would also make a referral to Under 60 Waiver with [facility]. Referral made.  Watching for home O2 needs, left message with Raiza rivera/ Fiorella to inform of possible o2 needs at OR.    The Plan for Transition of Care is related to the following treatment goals of COPD exacerbation (HCC) [J44.1]  Chest pain, unspecified type [R07.9]    Sadia Salas RN  Case Management Department  Ph: [phone]

## 2024-10-04 NOTE — PROGRESS NOTES
Patient family member came out of room saying \"patient can't breathe.\" Patient was sitting on side of bed, with an inhaler, stated he woke up out of his sleep/SOB. He state he took one puff of his inhaler. Patient is on 2 L 91% wheezes. Patient had declined a duoneb earlier in the shift and at this moment. Message sent via Serviceful, NP agreed with a duoneb. RN spoke with patient, he again, declines a Duoneb, patient said he felt better. No other respiratory needs at this time.

## 2024-10-04 NOTE — PROGRESS NOTES
Pulmonary Progress Note    Date of Admission: 10/2/2024   LOS: 2 days     Chief Complaint   Patient presents with    Illness     Cough, chills, headache, body aches, diaphoresis, SOB on exertion and while lying down x 3 days. Hx COPD, asthma. Denies cardic hx. 93% RA       Assessment/Plan:       Acute exacerbation of COPD  Very severe COPD  -Pneumonia panel/respiratory culture  -Symbicort/Tudorza, albuterol as needed  -Systemic steroids x 5 days  -Azithromycin x 3 days  -Wean oxygen goal saturation 90%  -Continue Acapella/I-S  Mucinex PRN      24 Hour Events/Subjective  Overnight had an episode of acute dyspnea.  Patient states he woke up with his oxygen off gasping.  Used his bedside inhaler with improvement after a few seconds.  Does not like nebulizers.    ROS:   No nausea  No Vomiting  No chest pain      Intake/Output Summary (Last 24 hours) at 10/4/2024 0920  Last data filed at 10/4/2024 0600  Gross per 24 hour   Intake 840 ml   Output 600 ml   Net 240 ml         PHYSICAL EXAM:   Blood pressure 128/87, pulse (!) 102, temperature 98.1 °F (36.7 °C), temperature source Oral, resp. rate 18, height 1.676 m (5' 6\"), weight 107 kg (235 lb 14.3 oz), SpO2 91%.'  Gen:  No acute distress.   Resp:  No crackles. + wheezes. No rhonchi. No dullness on percussion.  CV: Regular rate. Regular rhythm. No murmur or rub. No edema.   Neuro:  no focal neurologic deficit.  Moves all extremities  Psych: Awake and alert  Mood stable.      Labs reviewed:  CBC:   Recent Labs     10/02/24  0917 10/03/24  0653   WBC 12.8* 11.8*   HGB 15.2 13.8   HCT 44.8 43.6   MCV 83.0 85.1    321     BMP:   Recent Labs     10/02/24  0917 10/03/24  0653    136   K 3.9 4.9    99   CO2 26 29   PHOS  --  4.5   BUN 13 16   CREATININE 1.0 1.0     LIVER PROFILE:   Recent Labs     10/02/24  0917   AST 28   ALT 19   BILITOT 0.8   ALKPHOS 96     PT/INR: No results for input(s): \"PROTIME\", \"INR\" in the last 72 hours.        Films:  Radiology  Review:  Pertinent images / reports were reviewed as a part of this visit.            This note was transcribed using Dragon Dictation software. Please disregard any translational errors.    Thank you for this consult,    Triston Culver MD  [facility] Pulmonary, Critical Care, and Sleep Medicine

## 2024-10-05 PROCEDURE — 6360000002 HC RX W HCPCS: Performed by: INTERNAL MEDICINE

## 2024-10-05 PROCEDURE — 6370000000 HC RX 637 (ALT 250 FOR IP): Performed by: INTERNAL MEDICINE

## 2024-10-05 PROCEDURE — 2580000003 HC RX 258: Performed by: INTERNAL MEDICINE

## 2024-10-05 PROCEDURE — 94669 MECHANICAL CHEST WALL OSCILL: CPT

## 2024-10-05 PROCEDURE — 94761 N-INVAS EAR/PLS OXIMETRY MLT: CPT

## 2024-10-05 PROCEDURE — 2700000000 HC OXYGEN THERAPY PER DAY

## 2024-10-05 PROCEDURE — 99233 SBSQ HOSP IP/OBS HIGH 50: CPT | Performed by: INTERNAL MEDICINE

## 2024-10-05 PROCEDURE — 94640 AIRWAY INHALATION TREATMENT: CPT

## 2024-10-05 PROCEDURE — 2060000000 HC ICU INTERMEDIATE R&B

## 2024-10-05 RX ADMIN — Medication 2 PUFF: at 12:37

## 2024-10-05 RX ADMIN — AMLODIPINE BESYLATE 10 MG: 10 TABLET ORAL at 09:41

## 2024-10-05 RX ADMIN — PREDNISONE 40 MG: 20 TABLET ORAL at 09:41

## 2024-10-05 RX ADMIN — CEFTRIAXONE SODIUM 2000 MG: 2 INJECTION, POWDER, FOR SOLUTION INTRAMUSCULAR; INTRAVENOUS at 13:18

## 2024-10-05 RX ADMIN — ESCITALOPRAM OXALATE 10 MG: 10 TABLET ORAL at 09:41

## 2024-10-05 RX ADMIN — PANTOPRAZOLE SODIUM 40 MG: 40 TABLET, DELAYED RELEASE ORAL at 07:17

## 2024-10-05 RX ADMIN — ENOXAPARIN SODIUM 30 MG: 100 INJECTION SUBCUTANEOUS at 09:42

## 2024-10-05 RX ADMIN — ENOXAPARIN SODIUM 30 MG: 100 INJECTION SUBCUTANEOUS at 20:30

## 2024-10-05 RX ADMIN — LOSARTAN POTASSIUM 25 MG: 25 TABLET, FILM COATED ORAL at 09:41

## 2024-10-05 RX ADMIN — IPRATROPIUM BROMIDE AND ALBUTEROL SULFATE 1 DOSE: 2.5; .5 SOLUTION RESPIRATORY (INHALATION) at 20:50

## 2024-10-05 RX ADMIN — CETIRIZINE HYDROCHLORIDE 10 MG: 10 TABLET, FILM COATED ORAL at 09:41

## 2024-10-05 RX ADMIN — GUAIFENESIN 600 MG: 600 TABLET ORAL at 20:30

## 2024-10-05 RX ADMIN — GUAIFENESIN 600 MG: 600 TABLET ORAL at 09:41

## 2024-10-05 RX ADMIN — Medication 2 PUFF: at 20:53

## 2024-10-05 RX ADMIN — ASPIRIN 81 MG: 81 TABLET, COATED ORAL at 09:41

## 2024-10-05 RX ADMIN — Medication 2 PUFF: at 08:20

## 2024-10-05 RX ADMIN — IPRATROPIUM BROMIDE AND ALBUTEROL SULFATE 1 DOSE: 2.5; .5 SOLUTION RESPIRATORY (INHALATION) at 15:36

## 2024-10-05 RX ADMIN — ALPRAZOLAM 0.25 MG: 0.25 TABLET ORAL at 20:30

## 2024-10-05 RX ADMIN — CYCLOBENZAPRINE 10 MG: 10 TABLET, FILM COATED ORAL at 20:30

## 2024-10-05 RX ADMIN — Medication 2 PUFF: at 08:13

## 2024-10-05 RX ADMIN — SODIUM CHLORIDE, PRESERVATIVE FREE 10 ML: 5 INJECTION INTRAVENOUS at 09:42

## 2024-10-05 RX ADMIN — ALPRAZOLAM 0.25 MG: 0.25 TABLET ORAL at 14:04

## 2024-10-05 RX ADMIN — LEVOTHYROXINE SODIUM 175 MCG: 0.12 TABLET ORAL at 07:17

## 2024-10-05 ASSESSMENT — PAIN SCALES - GENERAL
PAINLEVEL_OUTOF10: 5
PAINLEVEL_OUTOF10: 0
PAINLEVEL_OUTOF10: 8

## 2024-10-05 ASSESSMENT — PAIN DESCRIPTION - ORIENTATION: ORIENTATION: LOWER

## 2024-10-05 ASSESSMENT — PAIN DESCRIPTION - LOCATION: LOCATION: BACK

## 2024-10-05 NOTE — PLAN OF CARE
Problem: Discharge Planning  Goal: Discharge to home or other facility with appropriate resources  10/5/2024 0954 by Beckie Monterroso RN  Outcome: Progressing  10/5/2024 0007 by Leobardo Guallpa RN  Outcome: Progressing  Flowsheets (Taken 10/4/2024 2022)  Discharge to home or other facility with appropriate resources: Identify barriers to discharge with patient and caregiver     Problem: Pain  Goal: Verbalizes/displays adequate comfort level or baseline comfort level  10/5/2024 0954 by Beckie Monterroso RN  Outcome: Progressing  10/5/2024 0007 by Leobardo Guallpa RN  Outcome: Progressing  Flowsheets (Taken 10/4/2024 2330)  Verbalizes/displays adequate comfort level or baseline comfort level: Encourage patient to monitor pain and request assistance     Problem: Safety - Adult  Goal: Free from fall injury  10/5/2024 0954 by Beckie Monterroso RN  Outcome: Progressing  10/5/2024 0007 by Leobardo Guallpa RN  Outcome: Progressing     Problem: Nutrition Deficit:  Goal: Optimize nutritional status  10/5/2024 0954 by Beckie Monterroso RN  Outcome: Progressing  10/5/2024 0007 by Leobardo Guallpa RN  Outcome: Progressing     Problem: Respiratory - Adult  Goal: Achieves optimal ventilation and oxygenation  10/5/2024 0954 by Beckie Monterroso RN  Outcome: Progressing  10/5/2024 0007 by Leobardo Guallpa RN  Outcome: Progressing

## 2024-10-05 NOTE — PROGRESS NOTES
Pulmonary Critical Care Progress Note     Patient's name:  Serg Burnette  Medical Record Number: [id number]  Patient's account/billing number: [id number]  Patient's YOB: 1965  Age: 58 y.o.  Date of Admission: 10/2/2024  9:00 AM  Date of Consult: 10/5/2024      Primary Care Physician: Catherine Mims APRN - CNP      Code Status: Full Code    Chief complaint: COPD with acute exacerbation    Assessment and Plan      severe COPD with acute exacerbation  RSV and Moraxella pneumonia  Chronic respiratory failure with hypoxia    Plan:  Ceftriaxone, done with azithromycin  Prednisone x 5 days  Home O2 before discharge  Need out pt sleep study            Overnight:  Still wheezing but improving    REVIEW OF SYSTEMS:  Review of Systems -   General ROS: negative  Psychological ROS: negative  Ophthalmic ROS: negative  ENT ROS: negative  Allergy and Immunology ROS: negative  Hematological and Lymphatic ROS: negative  Endocrine ROS: negative  Breast ROS: negative  Respiratory ROS: Cough, shortness of breath cardiovascular ROS: no chest pain or dyspnea on exertion  Gastrointestinal ROS:negative  Genito-Urinary ROS: negative  Musculoskeletal ROS: negative  Neurological ROS: negative  Dermatological ROS: negative        Physical Exam:    Vitals: /68   Pulse 85   Temp 98.2 °F (36.8 °C) (Oral)   Resp 19   Ht 1.676 m (5' 6\")   Wt 107 kg (235 lb 14.3 oz)   SpO2 93%   BMI 38.07 kg/m²     Last Body weight:   Wt Readings from Last 3 Encounters:   10/04/24 107 kg (235 lb 14.3 oz)       Body Mass Index : Body mass index is 38.07 kg/m².      Intake and Output summary:   Intake/Output Summary (Last 24 hours) at 10/5/2024 1214  Last data filed at 10/5/2024 0951  Gross per 24 hour   Intake 360 ml   Output 350 ml   Net 10 ml       Physical Examination:     Gen: Mild acute distress  Eyes: PERRL. Anicteric sclera. No conjunctival injection.   ENT: No discharge. Posterior oropharynx clear. External appearance of

## 2024-10-05 NOTE — PROGRESS NOTES
Hospitalist Progress Note      PCP: Catherine Mims, APRN - CNP    Date of Admission: 10/2/2024    Subjective: had an episode of SOB overnight, refusing duoneb    Medications:  Reviewed    Infusion Medications    sodium chloride       Scheduled Medications    guaiFENesin  600 mg Oral BID    amLODIPine  10 mg Oral Daily    aspirin  81 mg Oral Daily    cetirizine  10 mg Oral Daily    escitalopram  10 mg Oral Daily    levothyroxine  175 mcg Oral Daily    losartan  25 mg Oral Daily    pantoprazole  40 mg Oral QAM AC    aclidinium  1 puff Inhalation BID RT    sodium chloride flush  5-40 mL IntraVENous 2 times per day    enoxaparin  30 mg SubCUTAneous BID    ipratropium 0.5 mg-albuterol 2.5 mg  1 Dose Inhalation Q4H WA RT    [START ON 10/5/2024] predniSONE  40 mg Oral Daily    cefTRIAXone (ROCEPHIN) IV  2,000 mg IntraVENous Q24H    budesonide-formoterol  2 puff Inhalation BID RT     PRN Meds: ALPRAZolam, benzocaine-menthol, albuterol sulfate HFA, albuterol sulfate HFA, cyclobenzaprine, sodium chloride flush, sodium chloride, ondansetron **OR** ondansetron, polyethylene glycol, acetaminophen **OR** acetaminophen, guaiFENesin-dextromethorphan, benzonatate, perflutren lipid microspheres      Intake/Output Summary (Last 24 hours) at 10/4/2024 2226  Last data filed at 10/4/2024 2014  Gross per 24 hour   Intake 960 ml   Output 950 ml   Net 10 ml       Physical Exam Performed:    /86   Pulse (!) 101   Temp 98.2 °F (36.8 °C) (Oral)   Resp 18   Ht 1.676 m (5' 6\")   Wt 107 kg (235 lb 14.3 oz)   SpO2 94%   BMI 38.07 kg/m²        General appearance: NAD  Lungs: diminished b/l  Heart: Regular rate and rhythm with Normal S1/S2 without murmurs  Abdomen: Soft, non-tender or non-distended without rigidity or guarding and positive bowel sounds  Extremities: No clubbing, cyanosis, or edema bilaterally.    Skin: Skin color, texture, turgor normal.  No rashes or lesions.  Neurologic: Alert and oriented X 3, neurovascularly

## 2024-10-05 NOTE — PROGRESS NOTES
Pt called out saying he could not breathe. Pt requested his o2 to be turned up, pt turned up to 5L and O2 saturation was at 98%. Pt weaned backed down to 2L. Patient was offered breathing treatment but declined. PRN anxiety medication given.   Electronically signed by Beckie Monterroso RN on 10/5/2024 at 2:22 PM

## 2024-10-05 NOTE — PLAN OF CARE
Problem: Discharge Planning  Goal: Discharge to home or other facility with appropriate resources  Outcome: Progressing  Flowsheets (Taken 10/4/2024 2022)  Discharge to home or other facility with appropriate resources: Identify barriers to discharge with patient and caregiver     Problem: Pain  Goal: Verbalizes/displays adequate comfort level or baseline comfort level  Outcome: Progressing  Flowsheets (Taken 10/4/2024 2330)  Verbalizes/displays adequate comfort level or baseline comfort level: Encourage patient to monitor pain and request assistance     Problem: Safety - Adult  Goal: Free from fall injury  Outcome: Progressing     Problem: Nutrition Deficit:  Goal: Optimize nutritional status  Outcome: Progressing     Problem: Respiratory - Adult  Goal: Achieves optimal ventilation and oxygenation  Outcome: Progressing

## 2024-10-06 VITALS
TEMPERATURE: 97.9 F | BODY MASS INDEX: 37.8 KG/M2 | SYSTOLIC BLOOD PRESSURE: 122 MMHG | DIASTOLIC BLOOD PRESSURE: 78 MMHG | WEIGHT: 235.23 LBS | HEART RATE: 77 BPM | OXYGEN SATURATION: 99 % | HEIGHT: 66 IN | RESPIRATION RATE: 19 BRPM

## 2024-10-06 LAB
ANION GAP SERPL CALCULATED.3IONS-SCNC: 6 MMOL/L (ref 3–16)
ANISOCYTOSIS BLD QL SMEAR: ABNORMAL
BASOPHILS # BLD: 0 K/UL (ref 0–0.2)
BASOPHILS NFR BLD: 0 %
BUN SERPL-MCNC: 18 MG/DL (ref 7–20)
CALCIUM SERPL-MCNC: 8.3 MG/DL (ref 8.3–10.6)
CHLORIDE SERPL-SCNC: 100 MMOL/L (ref 99–110)
CO2 SERPL-SCNC: 36 MMOL/L (ref 21–32)
CREAT SERPL-MCNC: 0.8 MG/DL (ref 0.9–1.3)
DEPRECATED RDW RBC AUTO: 16.3 % (ref 12.4–15.4)
EOSINOPHIL # BLD: 0.1 K/UL (ref 0–0.6)
EOSINOPHIL NFR BLD: 1 %
GFR SERPLBLD CREATININE-BSD FMLA CKD-EPI: >90 ML/MIN/{1.73_M2}
GLUCOSE SERPL-MCNC: 90 MG/DL (ref 70–99)
HCT VFR BLD AUTO: 40.6 % (ref 40.5–52.5)
HGB BLD-MCNC: 12.8 G/DL (ref 13.5–17.5)
LYMPHOCYTES # BLD: 0.8 K/UL (ref 1–5.1)
LYMPHOCYTES NFR BLD: 7 %
MCH RBC QN AUTO: 26.8 PG (ref 26–34)
MCHC RBC AUTO-ENTMCNC: 31.5 G/DL (ref 31–36)
MCV RBC AUTO: 85 FL (ref 80–100)
MONOCYTES # BLD: 0.4 K/UL (ref 0–1.3)
MONOCYTES NFR BLD: 3 %
MYELOCYTES NFR BLD MANUAL: 1 %
NEUTROPHILS # BLD: 10.7 K/UL (ref 1.7–7.7)
NEUTROPHILS NFR BLD: 88 %
PLATELET # BLD AUTO: 358 K/UL (ref 135–450)
PMV BLD AUTO: 8.1 FL (ref 5–10.5)
POTASSIUM SERPL-SCNC: 4.1 MMOL/L (ref 3.5–5.1)
RBC # BLD AUTO: 4.77 M/UL (ref 4.2–5.9)
SLIDE REVIEW: ABNORMAL
SODIUM SERPL-SCNC: 142 MMOL/L (ref 136–145)
WBC # BLD AUTO: 12 K/UL (ref 4–11)

## 2024-10-06 PROCEDURE — 99233 SBSQ HOSP IP/OBS HIGH 50: CPT | Performed by: INTERNAL MEDICINE

## 2024-10-06 PROCEDURE — 85025 COMPLETE CBC W/AUTO DIFF WBC: CPT

## 2024-10-06 PROCEDURE — 94669 MECHANICAL CHEST WALL OSCILL: CPT

## 2024-10-06 PROCEDURE — 36415 COLL VENOUS BLD VENIPUNCTURE: CPT

## 2024-10-06 PROCEDURE — 80048 BASIC METABOLIC PNL TOTAL CA: CPT

## 2024-10-06 PROCEDURE — 6360000002 HC RX W HCPCS: Performed by: INTERNAL MEDICINE

## 2024-10-06 PROCEDURE — 2700000000 HC OXYGEN THERAPY PER DAY

## 2024-10-06 PROCEDURE — 6370000000 HC RX 637 (ALT 250 FOR IP): Performed by: INTERNAL MEDICINE

## 2024-10-06 PROCEDURE — 2060000000 HC ICU INTERMEDIATE R&B

## 2024-10-06 PROCEDURE — 2580000003 HC RX 258: Performed by: INTERNAL MEDICINE

## 2024-10-06 PROCEDURE — 94761 N-INVAS EAR/PLS OXIMETRY MLT: CPT

## 2024-10-06 PROCEDURE — 94640 AIRWAY INHALATION TREATMENT: CPT

## 2024-10-06 RX ADMIN — ENOXAPARIN SODIUM 30 MG: 100 INJECTION SUBCUTANEOUS at 10:29

## 2024-10-06 RX ADMIN — GUAIFENESIN 600 MG: 600 TABLET ORAL at 10:29

## 2024-10-06 RX ADMIN — IPRATROPIUM BROMIDE AND ALBUTEROL SULFATE 1 DOSE: 2.5; .5 SOLUTION RESPIRATORY (INHALATION) at 08:25

## 2024-10-06 RX ADMIN — LEVOTHYROXINE SODIUM 175 MCG: 0.12 TABLET ORAL at 06:56

## 2024-10-06 RX ADMIN — Medication 2 PUFF: at 19:41

## 2024-10-06 RX ADMIN — PREDNISONE 40 MG: 20 TABLET ORAL at 10:29

## 2024-10-06 RX ADMIN — LOSARTAN POTASSIUM 25 MG: 25 TABLET, FILM COATED ORAL at 10:28

## 2024-10-06 RX ADMIN — CEFTRIAXONE SODIUM 2000 MG: 2 INJECTION, POWDER, FOR SOLUTION INTRAMUSCULAR; INTRAVENOUS at 13:19

## 2024-10-06 RX ADMIN — ENOXAPARIN SODIUM 30 MG: 100 INJECTION SUBCUTANEOUS at 20:25

## 2024-10-06 RX ADMIN — IPRATROPIUM BROMIDE AND ALBUTEROL SULFATE 1 DOSE: 2.5; .5 SOLUTION RESPIRATORY (INHALATION) at 11:30

## 2024-10-06 RX ADMIN — SODIUM CHLORIDE, PRESERVATIVE FREE 10 ML: 5 INJECTION INTRAVENOUS at 20:26

## 2024-10-06 RX ADMIN — ALPRAZOLAM 0.25 MG: 0.25 TABLET ORAL at 20:25

## 2024-10-06 RX ADMIN — ASPIRIN 81 MG: 81 TABLET, COATED ORAL at 10:28

## 2024-10-06 RX ADMIN — IPRATROPIUM BROMIDE AND ALBUTEROL SULFATE 1 DOSE: 2.5; .5 SOLUTION RESPIRATORY (INHALATION) at 19:41

## 2024-10-06 RX ADMIN — AMLODIPINE BESYLATE 10 MG: 10 TABLET ORAL at 10:29

## 2024-10-06 RX ADMIN — Medication 2 PUFF: at 08:28

## 2024-10-06 RX ADMIN — CETIRIZINE HYDROCHLORIDE 10 MG: 10 TABLET, FILM COATED ORAL at 10:29

## 2024-10-06 RX ADMIN — PANTOPRAZOLE SODIUM 40 MG: 40 TABLET, DELAYED RELEASE ORAL at 06:56

## 2024-10-06 RX ADMIN — SODIUM CHLORIDE, PRESERVATIVE FREE 10 ML: 5 INJECTION INTRAVENOUS at 14:16

## 2024-10-06 RX ADMIN — POLYETHYLENE GLYCOL 3350 17 G: 17 POWDER, FOR SOLUTION ORAL at 10:29

## 2024-10-06 RX ADMIN — GUAIFENESIN 600 MG: 600 TABLET ORAL at 20:25

## 2024-10-06 RX ADMIN — ESCITALOPRAM OXALATE 10 MG: 10 TABLET ORAL at 10:29

## 2024-10-06 ASSESSMENT — PAIN SCALES - GENERAL: PAINLEVEL_OUTOF10: 0

## 2024-10-06 NOTE — PLAN OF CARE
Problem: Discharge Planning  Goal: Discharge to home or other facility with appropriate resources  Outcome: Progressing     Problem: Pain  Goal: Verbalizes/displays adequate comfort level or baseline comfort level  Outcome: Progressing  Flowsheets (Taken 10/5/2024 2300)  Verbalizes/displays adequate comfort level or baseline comfort level: Encourage patient to monitor pain and request assistance     Problem: Safety - Adult  Goal: Free from fall injury  Outcome: Progressing     Problem: Nutrition Deficit:  Goal: Optimize nutritional status  Outcome: Progressing     Problem: Respiratory - Adult  Goal: Achieves optimal ventilation and oxygenation  Outcome: Progressing  Flowsheets (Taken 10/5/2024 2130)  Achieves optimal ventilation and oxygenation: Assess for changes in respiratory status     Problem: Gastrointestinal - Adult  Goal: Minimal or absence of nausea and vomiting  Outcome: Progressing

## 2024-10-06 NOTE — PROGRESS NOTES
further recs     Acute hypoxic resp failure - cont O2, wean as tolerated, O2 sats dropped to 87% on RA, wean as tolerated        DVT Prophylaxis: lovenox  Diet: ADULT DIET; Regular  ADULT ORAL NUTRITION SUPPLEMENT; Breakfast, Lunch, Dinner; Standard High Calorie/High Protein Oral Supplement  Code Status: Full Code  PT/OT Eval Status: ordered    Dispo - cont care, poss dc in 1-2 days      Otilia Bernabe MD

## 2024-10-06 NOTE — PROGRESS NOTES
Spoke with provider KANCHAN Bernabe MD at beginning of shift regarding orders for pt obtaining home oxygen. I voiced concerns with pt not being happy with the change in plans with what was talked about today. When I spoke with patient about overnight oximetry study mentioned by primary MD I explained process and what would be done through out night. I also explained the difference if we would just do an oxygen qualification test for home oxygen to be approved at all times and not just night. He voiced his preference of doing the day time study that can be done with RT at bedside and not a machine to record just night time. At this time he is on 2L NC and going to stay on that remainder of night and in the morning with day shift perform test that will get him approved for oxygen at all times. Him and wife at bedside happy with plan  and in agreement.

## 2024-10-06 NOTE — PROGRESS NOTES
Pulmonary Critical Care Progress Note     Patient's name:  Serg Burnette  Medical Record Number: [id number]  Patient's account/billing number: [id number]  Patient's YOB: 1965  Age: 58 y.o.  Date of Admission: 10/2/2024  9:00 AM  Date of Consult: 10/6/2024      Primary Care Physician: Catherine Mims APRN - CNP      Code Status: Full Code    Chief complaint: COPD with acute exacerbation    Assessment and Plan      severe COPD with acute exacerbation  RSV and Moraxella pneumonia  Chronic respiratory failure with hypoxia    Plan:  Ceftriaxone, done with azithromycin  Prednisone x 5 days  Home O2 before discharge  Need out pt sleep study            Overnight:  Still wheezing but improving    REVIEW OF SYSTEMS:  Review of Systems -   General ROS: negative  Psychological ROS: negative  Ophthalmic ROS: negative  ENT ROS: negative  Allergy and Immunology ROS: negative  Hematological and Lymphatic ROS: negative  Endocrine ROS: negative  Breast ROS: negative  Respiratory ROS: Cough, shortness of breath cardiovascular ROS: no chest pain or dyspnea on exertion  Gastrointestinal ROS:negative  Genito-Urinary ROS: negative  Musculoskeletal ROS: negative  Neurological ROS: negative  Dermatological ROS: negative        Physical Exam:    Vitals: /75   Pulse 84   Temp 98.3 °F (36.8 °C) (Oral)   Resp 18   Ht 1.676 m (5' 6\")   Wt 106.7 kg (235 lb 3.7 oz)   SpO2 98%   BMI 37.97 kg/m²     Last Body weight:   Wt Readings from Last 3 Encounters:   10/06/24 106.7 kg (235 lb 3.7 oz)       Body Mass Index : Body mass index is 37.97 kg/m².      Intake and Output summary:   Intake/Output Summary (Last 24 hours) at 10/6/2024 1213  Last data filed at 10/6/2024 1005  Gross per 24 hour   Intake 120 ml   Output --   Net 120 ml       Physical Examination:     Gen: Mild acute distress  Eyes: PERRL. Anicteric sclera. No conjunctival injection.   ENT: No discharge. Posterior oropharynx clear. External appearance of

## 2024-10-06 NOTE — PLAN OF CARE
Problem: Discharge Planning  Goal: Discharge to home or other facility with appropriate resources  10/6/2024 1135 by Beckie Monterroso RN  Outcome: Progressing  10/6/2024 0456 by Leobardo Guallpa RN  Outcome: Progressing     Problem: Pain  Goal: Verbalizes/displays adequate comfort level or baseline comfort level  10/6/2024 1135 by Beckie Monterroso RN  Outcome: Progressing  10/6/2024 0456 by Leobardo Guallpa RN  Outcome: Progressing  Flowsheets (Taken 10/5/2024 2300)  Verbalizes/displays adequate comfort level or baseline comfort level: Encourage patient to monitor pain and request assistance     Problem: Safety - Adult  Goal: Free from fall injury  10/6/2024 1135 by Beckie Monterroso RN  Outcome: Progressing  10/6/2024 0456 by Leobardo Guallpa RN  Outcome: Progressing     Problem: Nutrition Deficit:  Goal: Optimize nutritional status  10/6/2024 1135 by Beckie Monterroso RN  Outcome: Progressing  10/6/2024 0456 by Leobardo Guallpa RN  Outcome: Progressing     Problem: Respiratory - Adult  Goal: Achieves optimal ventilation and oxygenation  10/6/2024 1135 by Beckie Monterroso RN  Outcome: Progressing  10/6/2024 0456 by Leobardo Guallpa RN  Outcome: Progressing  Flowsheets (Taken 10/5/2024 2130)  Achieves optimal ventilation and oxygenation: Assess for changes in respiratory status     Problem: Gastrointestinal - Adult  Goal: Minimal or absence of nausea and vomiting  10/6/2024 1135 by Beckie Monterroso RN  Outcome: Progressing  10/6/2024 0456 by Leobardo Guallpa RN  Outcome: Progressing

## 2024-10-06 NOTE — PROGRESS NOTES
Hospitalist Progress Note      PCP: Catherine Mims APRN - CNP    Date of Admission: 10/2/2024    Subjective: SOB feels better today    Medications:  Reviewed    Infusion Medications    sodium chloride       Scheduled Medications    guaiFENesin  600 mg Oral BID    amLODIPine  10 mg Oral Daily    aspirin  81 mg Oral Daily    cetirizine  10 mg Oral Daily    escitalopram  10 mg Oral Daily    levothyroxine  175 mcg Oral Daily    losartan  25 mg Oral Daily    pantoprazole  40 mg Oral QAM AC    aclidinium  1 puff Inhalation BID RT    sodium chloride flush  5-40 mL IntraVENous 2 times per day    enoxaparin  30 mg SubCUTAneous BID    ipratropium 0.5 mg-albuterol 2.5 mg  1 Dose Inhalation Q4H WA RT    predniSONE  40 mg Oral Daily    budesonide-formoterol  2 puff Inhalation BID RT     PRN Meds: ALPRAZolam, benzocaine-menthol, albuterol sulfate HFA, albuterol sulfate HFA, cyclobenzaprine, sodium chloride flush, sodium chloride, ondansetron **OR** ondansetron, polyethylene glycol, acetaminophen **OR** acetaminophen, guaiFENesin-dextromethorphan, benzonatate, perflutren lipid microspheres      Intake/Output Summary (Last 24 hours) at 10/6/2024 1517  Last data filed at 10/6/2024 1251  Gross per 24 hour   Intake 480 ml   Output --   Net 480 ml       Physical Exam Performed:    /75   Pulse 84   Temp 98.3 °F (36.8 °C) (Oral)   Resp 18   Ht 1.676 m (5' 6\")   Wt 106.7 kg (235 lb 3.7 oz)   SpO2 98%   BMI 37.97 kg/m²       General appearance: NAD  Lungs: diminished b/l  Heart: Regular rate and rhythm with Normal S1/S2 without murmurs  Abdomen: Soft, non-tender or non-distended without rigidity or guarding and positive bowel sounds  Extremities: No clubbing, cyanosis, or edema bilaterally.    Skin: Skin color, texture, turgor normal.  No rashes or lesions.  Neurologic: Alert and oriented X 3, neurovascularly intact with sensory/motor intact upper extremities/lower extremities, bilaterally.  Cranial nerves: II-XII

## 2024-10-07 VITALS
RESPIRATION RATE: 16 BRPM | SYSTOLIC BLOOD PRESSURE: 126 MMHG | HEIGHT: 66 IN | TEMPERATURE: 97.9 F | HEART RATE: 94 BPM | BODY MASS INDEX: 37.03 KG/M2 | WEIGHT: 230.38 LBS | DIASTOLIC BLOOD PRESSURE: 79 MMHG | OXYGEN SATURATION: 96 %

## 2024-10-07 LAB
ANION GAP SERPL CALCULATED.3IONS-SCNC: 5 MMOL/L (ref 3–16)
ANISOCYTOSIS BLD QL SMEAR: ABNORMAL
BASOPHILS # BLD: 0 K/UL (ref 0–0.2)
BASOPHILS NFR BLD: 0 %
BUN SERPL-MCNC: 16 MG/DL (ref 7–20)
CALCIUM SERPL-MCNC: 8.2 MG/DL (ref 8.3–10.6)
CHLORIDE SERPL-SCNC: 101 MMOL/L (ref 99–110)
CO2 SERPL-SCNC: 36 MMOL/L (ref 21–32)
CREAT SERPL-MCNC: 0.8 MG/DL (ref 0.9–1.3)
DEPRECATED RDW RBC AUTO: 16.2 % (ref 12.4–15.4)
EOSINOPHIL # BLD: 0 K/UL (ref 0–0.6)
EOSINOPHIL NFR BLD: 0 %
GFR SERPLBLD CREATININE-BSD FMLA CKD-EPI: >90 ML/MIN/{1.73_M2}
GLUCOSE SERPL-MCNC: 79 MG/DL (ref 70–99)
HCT VFR BLD AUTO: 39.7 % (ref 40.5–52.5)
HGB BLD-MCNC: 12.8 G/DL (ref 13.5–17.5)
LYMPHOCYTES # BLD: 2.1 K/UL (ref 1–5.1)
LYMPHOCYTES NFR BLD: 17 %
MCH RBC QN AUTO: 27.4 PG (ref 26–34)
MCHC RBC AUTO-ENTMCNC: 32.3 G/DL (ref 31–36)
MCV RBC AUTO: 84.7 FL (ref 80–100)
METAMYELOCYTES NFR BLD MANUAL: 1 %
MONOCYTES # BLD: 1.5 K/UL (ref 0–1.3)
MONOCYTES NFR BLD: 12 %
MYELOCYTES NFR BLD MANUAL: 1 %
NEUTROPHILS # BLD: 8.9 K/UL (ref 1.7–7.7)
NEUTROPHILS NFR BLD: 69 %
PLATELET # BLD AUTO: 356 K/UL (ref 135–450)
PMV BLD AUTO: 8.4 FL (ref 5–10.5)
POTASSIUM SERPL-SCNC: 3.9 MMOL/L (ref 3.5–5.1)
RBC # BLD AUTO: 4.69 M/UL (ref 4.2–5.9)
SODIUM SERPL-SCNC: 142 MMOL/L (ref 136–145)
WBC # BLD AUTO: 12.6 K/UL (ref 4–11)

## 2024-10-07 PROCEDURE — 80048 BASIC METABOLIC PNL TOTAL CA: CPT

## 2024-10-07 PROCEDURE — 94640 AIRWAY INHALATION TREATMENT: CPT

## 2024-10-07 PROCEDURE — 6370000000 HC RX 637 (ALT 250 FOR IP): Performed by: INTERNAL MEDICINE

## 2024-10-07 PROCEDURE — 94680 O2 UPTK RST&XERS DIR SIMPLE: CPT

## 2024-10-07 PROCEDURE — 99232 SBSQ HOSP IP/OBS MODERATE 35: CPT | Performed by: INTERNAL MEDICINE

## 2024-10-07 PROCEDURE — 36415 COLL VENOUS BLD VENIPUNCTURE: CPT

## 2024-10-07 PROCEDURE — 85025 COMPLETE CBC W/AUTO DIFF WBC: CPT

## 2024-10-07 PROCEDURE — 94761 N-INVAS EAR/PLS OXIMETRY MLT: CPT

## 2024-10-07 PROCEDURE — 6360000002 HC RX W HCPCS: Performed by: INTERNAL MEDICINE

## 2024-10-07 PROCEDURE — 2700000000 HC OXYGEN THERAPY PER DAY

## 2024-10-07 RX ORDER — BENZONATATE 100 MG/1
100 CAPSULE ORAL 3 TIMES DAILY PRN
Qty: 21 CAPSULE | Refills: 0 | Status: SHIPPED | OUTPATIENT
Start: 2024-10-07 | End: 2024-10-14

## 2024-10-07 RX ORDER — GUAIFENESIN 600 MG/1
600 TABLET, EXTENDED RELEASE ORAL 2 TIMES DAILY
Qty: 14 TABLET | Refills: 0 | Status: SHIPPED | OUTPATIENT
Start: 2024-10-07 | End: 2024-10-14

## 2024-10-07 RX ADMIN — PREDNISONE 40 MG: 20 TABLET ORAL at 09:02

## 2024-10-07 RX ADMIN — Medication 2 PUFF: at 07:48

## 2024-10-07 RX ADMIN — AMLODIPINE BESYLATE 10 MG: 10 TABLET ORAL at 09:02

## 2024-10-07 RX ADMIN — ESCITALOPRAM OXALATE 10 MG: 10 TABLET ORAL at 09:02

## 2024-10-07 RX ADMIN — CETIRIZINE HYDROCHLORIDE 10 MG: 10 TABLET, FILM COATED ORAL at 09:02

## 2024-10-07 RX ADMIN — IPRATROPIUM BROMIDE AND ALBUTEROL SULFATE 1 DOSE: 2.5; .5 SOLUTION RESPIRATORY (INHALATION) at 11:38

## 2024-10-07 RX ADMIN — ENOXAPARIN SODIUM 30 MG: 100 INJECTION SUBCUTANEOUS at 09:02

## 2024-10-07 RX ADMIN — LOSARTAN POTASSIUM 25 MG: 25 TABLET, FILM COATED ORAL at 09:02

## 2024-10-07 RX ADMIN — PANTOPRAZOLE SODIUM 40 MG: 40 TABLET, DELAYED RELEASE ORAL at 06:16

## 2024-10-07 RX ADMIN — ASPIRIN 81 MG: 81 TABLET, COATED ORAL at 09:02

## 2024-10-07 RX ADMIN — GUAIFENESIN 600 MG: 600 TABLET ORAL at 09:02

## 2024-10-07 RX ADMIN — IPRATROPIUM BROMIDE AND ALBUTEROL SULFATE 1 DOSE: 2.5; .5 SOLUTION RESPIRATORY (INHALATION) at 07:58

## 2024-10-07 RX ADMIN — LEVOTHYROXINE SODIUM 175 MCG: 0.12 TABLET ORAL at 06:16

## 2024-10-07 ASSESSMENT — PAIN SCALES - GENERAL
PAINLEVEL_OUTOF10: 0
PAINLEVEL_OUTOF10: 0

## 2024-10-07 NOTE — PROGRESS NOTES
Pulmonary Critical Care Progress Note     Patient's name:  Serg Burnette  Medical Record Number: [id number]  Patient's account/billing number: [id number]  Patient's YOB: 1965  Age: 58 y.o.  Date of Admission: 10/2/2024  9:00 AM  Date of Consult: 10/7/2024      Primary Care Physician: Catherine Mims APRN - CNP      Code Status: Full Code    Chief complaint: COPD with acute exacerbation    Assessment and Plan      severe COPD with acute exacerbation  RSV and Moraxella pneumonia  Chronic respiratory failure with hypoxia    Plan:  Home O2 before discharge  Need out pt sleep study  Out pt follow up arranged   Continue home inhalers, Tudorza and symbicort and as needed albuterol  Ok to d/c from pulmonary stand point      Overnight:  Sob and wheezing improved      REVIEW OF SYSTEMS:  Review of Systems -   General ROS: negative  Psychological ROS: negative  Ophthalmic ROS: negative  ENT ROS: negative  Allergy and Immunology ROS: negative  Hematological and Lymphatic ROS: negative  Endocrine ROS: negative  Breast ROS: negative  Respiratory ROS: Cough, shortness of breath cardiovascular ROS: no chest pain or dyspnea on exertion  Gastrointestinal ROS:negative  Genito-Urinary ROS: negative  Musculoskeletal ROS: negative  Neurological ROS: negative  Dermatological ROS: negative        Physical Exam:    Vitals: /82   Pulse 75   Temp 98.6 °F (37 °C) (Oral)   Resp 18   Ht 1.676 m (5' 6\")   Wt 104.5 kg (230 lb 6.1 oz)   SpO2 95%   BMI 37.18 kg/m²     Last Body weight:   Wt Readings from Last 3 Encounters:   10/07/24 104.5 kg (230 lb 6.1 oz)       Body Mass Index : Body mass index is 37.18 kg/m².      Intake and Output summary:   Intake/Output Summary (Last 24 hours) at 10/7/2024 0917  Last data filed at 10/6/2024 1946  Gross per 24 hour   Intake 840 ml   Output 1000 ml   Net -160 ml       Physical Examination:     Gen: Mild acute distress  Eyes: PERRL. Anicteric sclera. No conjunctival

## 2024-10-07 NOTE — CARE COORDINATION
Case Management Discharge Note          Date / Time of Note: 10/7/2024 1:38 PM                  Patient Name: Serg Burnette   YOB: 1965  Diagnosis: COPD exacerbation (HCC) [J44.1]  Chest pain, unspecified type [R07.9]   Date / Time: 10/2/2024  9:00 AM    Financial:  [facility] / Plan: ALLWELL FROM [facility] PLAN / Product Type: *No Product type* /      Pharmacy:    [facility]/pharm[facility] #[id number] - [place], [place] - [address] - P [phone] - F [phone]  [address]  [facility] [address]  Phone: [phone] Fax: [phone]      Assistance purchasing medications?: Potential Assistance Purchasing Medications: No  Assistance provided by Case Management: None at this time    DISCHARGE Disposition: Home- No Services Needed    Home Care:  Home Care ordered at discharge: Not indicated    Durable Medical Equipment:  DME Provider: Fiorella  Equipment obtained during hospitalization: new oxygen    Home Oxygen and Respiratory Equipment:  Oxygen needed at discharge?: Yes  Home Oxygen Company: [employer] Phone: [phone]   Portable tank available for discharge?: Yes    Referrals made at DISCHARGE for outpatient continued care:  Other: referral made to Medicaid for under 60 waiver home health aide services    Transportation:  Transportation PLAN for discharge: family   Mode of Transport: Private Car  Time of Transport: after O2 delivered    Transport form completed: Not Indicated    IMM Completed:   Yes, Case management has presented and reviewed IMM letter #2.       IMM Letter date given:: 10/07/24  IMM Letter time given:: 1250.   Patient and/or family/POA verbalized understanding of their medicare rights and appeal process if needed. Patient and/or family/POA has signed, initialed and placed the date and time on IMM letter #2 on the the appropriate lines. Copy of letter offered and they are aware that the original copy of IMM letter #2 is available prior to

## 2024-10-07 NOTE — CARE COORDINATION
Patient did qualify for home oxygen. Messaged Dr Bernabe to inform and requested she place DME oxygen orders. Carolina Barros made aware.    Electronically signed by Sadia Salas RN Case Management on 10/7/2024 at 12:01 PM

## 2024-10-07 NOTE — PROGRESS NOTES
10/07/24 1147   Resting (Room Air)   SpO2 90   HR 70   Resting (On O2)   SpO2 87   HR 90   During Walk (Room Air)   SpO2 86   HR 82   During Walk (On O2)   SpO2 96   HR 82   O2 Device Nasal cannula   O2 Flow Rate (l/min) 2 l/min   After Walk   SpO2 95   HR 82   O2 Device Nasal cannula   O2 Flow Rate (l/min) 2 l/min   Does the Patient Qualify for Home O2 Yes   Liter Flow at Rest 2   Liter Flow on Exertion 2   Does the Patient Need Portable Oxygen Tanks Yes

## 2024-10-07 NOTE — PROGRESS NOTES
Discharge orders acknowledged by RN . Discharge teaching completed with pt and family. AVS reviewed and all questions answered. Medication regimen reviewed and pt understands schedule. MedsToBeds received. Follow up appointments also reviewed with pt and resources given for discharge. IV removed. Portable monitor removed from pt and returned to cmu. Pt vitals WNL. Pt discharged with all belongings. Pt transported off of unit via wheelchair and declined to wear his o2 out of the building but was sent with portable tank. No complications.    Electronically signed by Beckie Monterroso RN on 10/7/2024 at 3:48 PM

## 2024-10-07 NOTE — PROGRESS NOTES
CLINICAL PHARMACY NOTE: MEDS TO BEDS    Total # of Prescriptions Filled: 0   The following medications were delivered to the patient:  Current Discharge Medication List        START taking these medications    Details   benzonatate (TESSALON) 100 MG capsule Take 1 capsule by mouth 3 times daily as needed for Cough  Qty: 21 capsule, Refills: 0      guaiFENesin (MUCINEX) 600 MG extended release tablet Take 1 tablet by mouth 2 times daily for 7 days  Qty: 14 tablet, Refills: 0               Additional Documentation:  Pt refused medications at bedside stating he had them at home, We will put on profile if pt changes mind and needs them later

## 2024-10-07 NOTE — PROGRESS NOTES
Patient was evaluated today for the diagnosis of COPD.  I entered a DME order for home oxygen at 2L lpm because the diagnosis and testing require the patient to have supplemental oxygen.  Condition will improve or be benefited by oxygen use.  The patient is  able to perform good mobility in a home setting and therefore does require the use of a portable oxygen system.  The need for this equipment was discussed with the patient and he understands and is in agreement.    No

## 2024-10-07 NOTE — PLAN OF CARE
Problem: Discharge Planning  Goal: Discharge to home or other facility with appropriate resources  10/7/2024 1005 by Beckie Monterroso RN  Outcome: Completed  10/7/2024 0207 by Leobardo Guallpa RN  Outcome: Progressing  Flowsheets (Taken 10/6/2024 2028)  Discharge to home or other facility with appropriate resources: Identify barriers to discharge with patient and caregiver     Problem: Pain  Goal: Verbalizes/displays adequate comfort level or baseline comfort level  10/7/2024 1005 by Beckie Monterroso RN  Outcome: Completed  Flowsheets (Taken 10/7/2024 0207 by Leobardo Guallpa RN)  Verbalizes/displays adequate comfort level or baseline comfort level:   Encourage patient to monitor pain and request assistance   Assess pain using appropriate pain scale   Implement non-pharmacological measures as appropriate and evaluate response   Administer analgesics based on type and severity of pain and evaluate response  10/7/2024 0207 by Leobardo Guallpa RN  Outcome: Progressing  Flowsheets (Taken 10/7/2024 0207)  Verbalizes/displays adequate comfort level or baseline comfort level:   Encourage patient to monitor pain and request assistance   Assess pain using appropriate pain scale   Implement non-pharmacological measures as appropriate and evaluate response   Administer analgesics based on type and severity of pain and evaluate response     Problem: Safety - Adult  Goal: Free from fall injury  10/7/2024 1005 by Beckie Monterroso RN  Outcome: Completed  Flowsheets (Taken 10/7/2024 0207 by Leobardo Guallpa RN)  Free From Fall Injury: Instruct family/caregiver on patient safety  10/7/2024 0207 by Leobardo Guallpa RN  Outcome: Progressing  Flowsheets (Taken 10/7/2024 0207)  Free From Fall Injury: Instruct family/caregiver on patient safety     Problem: Nutrition Deficit:  Goal: Optimize nutritional status  10/7/2024 1005 by Beckie Monterroso RN  Outcome: Completed  Flowsheets (Taken 10/7/2024 0207 by Leobardo Guallpa RN)  Nutrient intake appropriate for

## 2024-10-07 NOTE — PLAN OF CARE
Problem: Discharge Planning  Goal: Discharge to home or other facility with appropriate resources  Outcome: Progressing  Flowsheets (Taken 10/6/2024 2028)  Discharge to home or other facility with appropriate resources: Identify barriers to discharge with patient and caregiver     Problem: Pain  Goal: Verbalizes/displays adequate comfort level or baseline comfort level  Outcome: Progressing  Flowsheets (Taken 10/7/2024 0207)  Verbalizes/displays adequate comfort level or baseline comfort level:   Encourage patient to monitor pain and request assistance   Assess pain using appropriate pain scale   Implement non-pharmacological measures as appropriate and evaluate response   Administer analgesics based on type and severity of pain and evaluate response     Problem: Safety - Adult  Goal: Free from fall injury  Outcome: Progressing  Flowsheets (Taken 10/7/2024 0207)  Free From Fall Injury: Instruct family/caregiver on patient safety     Problem: Nutrition Deficit:  Goal: Optimize nutritional status  Outcome: Progressing  Flowsheets (Taken 10/7/2024 0207)  Nutrient intake appropriate for improving, restoring, or maintaining nutritional needs:   Assess nutritional status and recommend course of action   Monitor oral intake, labs, and treatment plans     Problem: Respiratory - Adult  Goal: Achieves optimal ventilation and oxygenation  Outcome: Progressing  Flowsheets (Taken 10/7/2024 0207)  Achieves optimal ventilation and oxygenation:   Assess for changes in respiratory status   Assess for changes in mentation and behavior     Problem: Gastrointestinal - Adult  Goal: Minimal or absence of nausea and vomiting  Outcome: Progressing  Flowsheets (Taken 10/6/2024 2028)  Minimal or absence of nausea and vomiting: Administer IV fluids as ordered to ensure adequate hydration

## 2025-01-09 ENCOUNTER — HOSPITAL ENCOUNTER (INPATIENT)
Age: 60
LOS: 8 days | Discharge: HOME | End: 2025-01-17
Attending: EMERGENCY MEDICINE | Admitting: INTERNAL MEDICINE
Payer: MEDICARE

## 2025-01-09 ENCOUNTER — APPOINTMENT (OUTPATIENT)
Dept: GENERAL RADIOLOGY | Age: 60
End: 2025-01-09
Payer: MEDICARE

## 2025-01-09 DIAGNOSIS — J96.02 ACUTE RESPIRATORY FAILURE WITH HYPERCAPNIA: ICD-10-CM

## 2025-01-09 DIAGNOSIS — J44.1 COPD EXACERBATION (HCC): Primary | ICD-10-CM

## 2025-01-09 PROBLEM — I10 ESSENTIAL HYPERTENSION: Status: ACTIVE | Noted: 2025-01-09

## 2025-01-09 PROBLEM — J96.01 ACUTE RESPIRATORY FAILURE WITH HYPOXIA AND HYPERCAPNIA: Status: ACTIVE | Noted: 2025-01-09

## 2025-01-09 LAB
ALBUMIN SERPL-MCNC: 4.4 G/DL (ref 3.4–5)
ALBUMIN/GLOB SERPL: 1.3 {RATIO} (ref 1.1–2.2)
ALP SERPL-CCNC: 91 U/L (ref 40–129)
ALT SERPL-CCNC: 14 U/L (ref 10–40)
ANION GAP SERPL CALCULATED.3IONS-SCNC: 10 MMOL/L (ref 3–16)
AST SERPL-CCNC: 19 U/L (ref 15–37)
BASE EXCESS BLDV CALC-SCNC: 3.1 MMOL/L
BASE EXCESS BLDV CALC-SCNC: 3.3 MMOL/L
BASOPHILS # BLD: 0.1 K/UL (ref 0–0.2)
BASOPHILS NFR BLD: 0.5 %
BILIRUB SERPL-MCNC: 0.5 MG/DL (ref 0–1)
BUN SERPL-MCNC: 17 MG/DL (ref 7–20)
CALCIUM SERPL-MCNC: 9.4 MG/DL (ref 8.3–10.6)
CHLORIDE SERPL-SCNC: 104 MMOL/L (ref 99–110)
CO2 BLDV-SCNC: 32 MMOL/L
CO2 BLDV-SCNC: 34 MMOL/L
CO2 SERPL-SCNC: 27 MMOL/L (ref 21–32)
COHGB MFR BLDV: 2.4 %
COHGB MFR BLDV: 3.3 %
CREAT SERPL-MCNC: 1 MG/DL (ref 0.9–1.3)
DEPRECATED RDW RBC AUTO: 16.4 % (ref 12.4–15.4)
EOSINOPHIL # BLD: 0.1 K/UL (ref 0–0.6)
EOSINOPHIL NFR BLD: 1.2 %
FLUAV + FLUBV AG NOSE IA.RAPID: NOT DETECTED
FLUAV + FLUBV AG NOSE IA.RAPID: NOT DETECTED
GFR SERPLBLD CREATININE-BSD FMLA CKD-EPI: 88 ML/MIN/{1.73_M2}
GLUCOSE SERPL-MCNC: 104 MG/DL (ref 70–99)
HCO3 BLDV-SCNC: 31 MMOL/L (ref 23–29)
HCO3 BLDV-SCNC: 32 MMOL/L (ref 23–29)
HCT VFR BLD AUTO: 42.2 % (ref 40.5–52.5)
HGB BLD-MCNC: 13.6 G/DL (ref 13.5–17.5)
LACTATE BLDV-SCNC: 1.3 MMOL/L (ref 0.4–2)
LYMPHOCYTES # BLD: 0.3 K/UL (ref 1–5.1)
LYMPHOCYTES NFR BLD: 2.6 %
MCH RBC QN AUTO: 27.2 PG (ref 26–34)
MCHC RBC AUTO-ENTMCNC: 32.3 G/DL (ref 31–36)
MCV RBC AUTO: 84.2 FL (ref 80–100)
METHGB MFR BLDV: 0.4 %
METHGB MFR BLDV: 0.7 %
MONOCYTES # BLD: 1.4 K/UL (ref 0–1.3)
MONOCYTES NFR BLD: 11.7 %
NEUTROPHILS # BLD: 10.1 K/UL (ref 1.7–7.7)
NEUTROPHILS NFR BLD: 84 %
NT-PROBNP SERPL-MCNC: <36 PG/ML (ref 0–124)
O2 THERAPY: ABNORMAL
O2 THERAPY: ABNORMAL
PCO2 BLDV: 57.4 MMHG (ref 40–50)
PCO2 BLDV: 68.1 MMHG (ref 40–50)
PH BLDV: 7.28 [PH] (ref 7.35–7.45)
PH BLDV: 7.33 [PH] (ref 7.35–7.45)
PLATELET # BLD AUTO: 381 K/UL (ref 135–450)
PLATELET BLD QL SMEAR: ADEQUATE
PMV BLD AUTO: 8.8 FL (ref 5–10.5)
PO2 BLDV: 38 MMHG
PO2 BLDV: 52 MMHG
POTASSIUM SERPL-SCNC: 4.1 MMOL/L (ref 3.5–5.1)
PROT SERPL-MCNC: 7.9 G/DL (ref 6.4–8.2)
RBC # BLD AUTO: 5 M/UL (ref 4.2–5.9)
RSV AG NOSE QL: NEGATIVE
SAO2 % BLDV: 63 %
SAO2 % BLDV: 85 %
SARS-COV-2 RDRP RESP QL NAA+PROBE: NOT DETECTED
SLIDE REVIEW: ABNORMAL
SODIUM SERPL-SCNC: 141 MMOL/L (ref 136–145)
TROPONIN, HIGH SENSITIVITY: 18 NG/L (ref 0–22)
WBC # BLD AUTO: 12 K/UL (ref 4–11)

## 2025-01-09 PROCEDURE — 71045 X-RAY EXAM CHEST 1 VIEW: CPT

## 2025-01-09 PROCEDURE — 5A09557 ASSISTANCE WITH RESPIRATORY VENTILATION, GREATER THAN 96 CONSECUTIVE HOURS, CONTINUOUS POSITIVE AIRWAY PRESSURE: ICD-10-PCS | Performed by: STUDENT IN AN ORGANIZED HEALTH CARE EDUCATION/TRAINING PROGRAM

## 2025-01-09 PROCEDURE — 2700000000 HC OXYGEN THERAPY PER DAY

## 2025-01-09 PROCEDURE — 36415 COLL VENOUS BLD VENIPUNCTURE: CPT

## 2025-01-09 PROCEDURE — 82803 BLOOD GASES ANY COMBINATION: CPT

## 2025-01-09 PROCEDURE — 84484 ASSAY OF TROPONIN QUANT: CPT

## 2025-01-09 PROCEDURE — 83880 ASSAY OF NATRIURETIC PEPTIDE: CPT

## 2025-01-09 PROCEDURE — 85025 COMPLETE CBC W/AUTO DIFF WBC: CPT

## 2025-01-09 PROCEDURE — 6360000002 HC RX W HCPCS: Performed by: NURSE PRACTITIONER

## 2025-01-09 PROCEDURE — 87635 SARS-COV-2 COVID-19 AMP PRB: CPT

## 2025-01-09 PROCEDURE — 2060000000 HC ICU INTERMEDIATE R&B

## 2025-01-09 PROCEDURE — 93005 ELECTROCARDIOGRAM TRACING: CPT | Performed by: EMERGENCY MEDICINE

## 2025-01-09 PROCEDURE — 87502 INFLUENZA DNA AMP PROBE: CPT

## 2025-01-09 PROCEDURE — 87807 RSV ASSAY W/OPTIC: CPT

## 2025-01-09 PROCEDURE — 83605 ASSAY OF LACTIC ACID: CPT

## 2025-01-09 PROCEDURE — 80053 COMPREHEN METABOLIC PANEL: CPT

## 2025-01-09 PROCEDURE — 2500000003 HC RX 250 WO HCPCS: Performed by: INTERNAL MEDICINE

## 2025-01-09 PROCEDURE — 94640 AIRWAY INHALATION TREATMENT: CPT

## 2025-01-09 PROCEDURE — 6370000000 HC RX 637 (ALT 250 FOR IP): Performed by: NURSE PRACTITIONER

## 2025-01-09 PROCEDURE — 94660 CPAP INITIATION&MGMT: CPT

## 2025-01-09 PROCEDURE — 6360000002 HC RX W HCPCS: Performed by: INTERNAL MEDICINE

## 2025-01-09 PROCEDURE — 94760 N-INVAS EAR/PLS OXIMETRY 1: CPT

## 2025-01-09 PROCEDURE — 2580000003 HC RX 258: Performed by: NURSE PRACTITIONER

## 2025-01-09 PROCEDURE — 99285 EMERGENCY DEPT VISIT HI MDM: CPT

## 2025-01-09 RX ORDER — ALBUTEROL SULFATE 0.83 MG/ML
2.5 SOLUTION RESPIRATORY (INHALATION)
Status: DISCONTINUED | OUTPATIENT
Start: 2025-01-09 | End: 2025-01-14

## 2025-01-09 RX ORDER — SODIUM CHLORIDE 0.9 % (FLUSH) 0.9 %
5-40 SYRINGE (ML) INJECTION PRN
Status: DISCONTINUED | OUTPATIENT
Start: 2025-01-09 | End: 2025-01-17 | Stop reason: HOSPADM

## 2025-01-09 RX ORDER — PREDNISONE 20 MG/1
40 TABLET ORAL DAILY
Status: DISCONTINUED | OUTPATIENT
Start: 2025-01-12 | End: 2025-01-12

## 2025-01-09 RX ORDER — MAGNESIUM SULFATE IN WATER 40 MG/ML
2000 INJECTION, SOLUTION INTRAVENOUS ONCE
Status: COMPLETED | OUTPATIENT
Start: 2025-01-09 | End: 2025-01-09

## 2025-01-09 RX ORDER — MAGNESIUM HYDROXIDE/ALUMINUM HYDROXICE/SIMETHICONE 120; 1200; 1200 MG/30ML; MG/30ML; MG/30ML
30 SUSPENSION ORAL EVERY 6 HOURS PRN
Status: DISCONTINUED | OUTPATIENT
Start: 2025-01-09 | End: 2025-01-17 | Stop reason: HOSPADM

## 2025-01-09 RX ORDER — ALBUTEROL SULFATE 0.83 MG/ML
5 SOLUTION RESPIRATORY (INHALATION) ONCE
Status: COMPLETED | OUTPATIENT
Start: 2025-01-09 | End: 2025-01-09

## 2025-01-09 RX ORDER — METHYLPREDNISOLONE SODIUM SUCCINATE 40 MG/ML
40 INJECTION INTRAMUSCULAR; INTRAVENOUS EVERY 6 HOURS
Status: COMPLETED | OUTPATIENT
Start: 2025-01-09 | End: 2025-01-11

## 2025-01-09 RX ORDER — SODIUM CHLORIDE 0.9 % (FLUSH) 0.9 %
5-40 SYRINGE (ML) INJECTION EVERY 12 HOURS SCHEDULED
Status: DISCONTINUED | OUTPATIENT
Start: 2025-01-09 | End: 2025-01-17 | Stop reason: HOSPADM

## 2025-01-09 RX ORDER — IPRATROPIUM BROMIDE AND ALBUTEROL SULFATE 2.5; .5 MG/3ML; MG/3ML
1 SOLUTION RESPIRATORY (INHALATION) ONCE
Status: COMPLETED | OUTPATIENT
Start: 2025-01-09 | End: 2025-01-09

## 2025-01-09 RX ORDER — ONDANSETRON 4 MG/1
4 TABLET, ORALLY DISINTEGRATING ORAL EVERY 8 HOURS PRN
Status: DISCONTINUED | OUTPATIENT
Start: 2025-01-09 | End: 2025-01-17 | Stop reason: HOSPADM

## 2025-01-09 RX ORDER — ONDANSETRON 2 MG/ML
4 INJECTION INTRAMUSCULAR; INTRAVENOUS EVERY 6 HOURS PRN
Status: DISCONTINUED | OUTPATIENT
Start: 2025-01-09 | End: 2025-01-17 | Stop reason: HOSPADM

## 2025-01-09 RX ORDER — POLYETHYLENE GLYCOL 3350 17 G/17G
17 POWDER, FOR SOLUTION ORAL DAILY PRN
Status: DISCONTINUED | OUTPATIENT
Start: 2025-01-09 | End: 2025-01-17 | Stop reason: HOSPADM

## 2025-01-09 RX ORDER — IPRATROPIUM BROMIDE AND ALBUTEROL SULFATE 2.5; .5 MG/3ML; MG/3ML
1 SOLUTION RESPIRATORY (INHALATION)
Status: DISCONTINUED | OUTPATIENT
Start: 2025-01-10 | End: 2025-01-09

## 2025-01-09 RX ORDER — ACETAMINOPHEN 650 MG/1
650 SUPPOSITORY RECTAL EVERY 6 HOURS PRN
Status: DISCONTINUED | OUTPATIENT
Start: 2025-01-09 | End: 2025-01-17 | Stop reason: HOSPADM

## 2025-01-09 RX ORDER — SODIUM CHLORIDE 9 MG/ML
INJECTION, SOLUTION INTRAVENOUS PRN
Status: DISCONTINUED | OUTPATIENT
Start: 2025-01-09 | End: 2025-01-17 | Stop reason: HOSPADM

## 2025-01-09 RX ORDER — IPRATROPIUM BROMIDE AND ALBUTEROL SULFATE 2.5; .5 MG/3ML; MG/3ML
1 SOLUTION RESPIRATORY (INHALATION)
Status: DISCONTINUED | OUTPATIENT
Start: 2025-01-10 | End: 2025-01-14

## 2025-01-09 RX ORDER — ACETAMINOPHEN 325 MG/1
650 TABLET ORAL EVERY 6 HOURS PRN
Status: DISCONTINUED | OUTPATIENT
Start: 2025-01-09 | End: 2025-01-17 | Stop reason: HOSPADM

## 2025-01-09 RX ORDER — ACETAMINOPHEN 500 MG
1000 TABLET ORAL ONCE
Status: COMPLETED | OUTPATIENT
Start: 2025-01-09 | End: 2025-01-09

## 2025-01-09 RX ORDER — GUAIFENESIN/DEXTROMETHORPHAN 100-10MG/5
5 SYRUP ORAL EVERY 4 HOURS PRN
Status: DISCONTINUED | OUTPATIENT
Start: 2025-01-09 | End: 2025-01-16

## 2025-01-09 RX ORDER — ENOXAPARIN SODIUM 100 MG/ML
30 INJECTION SUBCUTANEOUS 2 TIMES DAILY
Status: DISCONTINUED | OUTPATIENT
Start: 2025-01-10 | End: 2025-01-17 | Stop reason: HOSPADM

## 2025-01-09 RX ADMIN — ALBUTEROL SULFATE 5 MG: 2.5 SOLUTION RESPIRATORY (INHALATION) at 19:55

## 2025-01-09 RX ADMIN — SODIUM CHLORIDE, PRESERVATIVE FREE 10 ML: 5 INJECTION INTRAVENOUS at 22:41

## 2025-01-09 RX ADMIN — IPRATROPIUM BROMIDE AND ALBUTEROL SULFATE 1 DOSE: 2.5; .5 SOLUTION RESPIRATORY (INHALATION) at 19:55

## 2025-01-09 RX ADMIN — AZITHROMYCIN MONOHYDRATE 500 MG: 500 INJECTION, POWDER, LYOPHILIZED, FOR SOLUTION INTRAVENOUS at 22:00

## 2025-01-09 RX ADMIN — IPRATROPIUM BROMIDE AND ALBUTEROL SULFATE 1 DOSE: 2.5; .5 SOLUTION RESPIRATORY (INHALATION) at 23:39

## 2025-01-09 RX ADMIN — METHYLPREDNISOLONE SODIUM SUCCINATE 40 MG: 40 INJECTION INTRAMUSCULAR; INTRAVENOUS at 23:24

## 2025-01-09 RX ADMIN — MAGNESIUM SULFATE HEPTAHYDRATE 2000 MG: 40 INJECTION, SOLUTION INTRAVENOUS at 23:31

## 2025-01-09 RX ADMIN — ACETAMINOPHEN 1000 MG: 500 TABLET ORAL at 23:26

## 2025-01-09 ASSESSMENT — PAIN DESCRIPTION - LOCATION
LOCATION: GENERALIZED
LOCATION: CHEST

## 2025-01-09 ASSESSMENT — PAIN SCALES - GENERAL
PAINLEVEL_OUTOF10: 5
PAINLEVEL_OUTOF10: 5

## 2025-01-09 ASSESSMENT — PAIN - FUNCTIONAL ASSESSMENT: PAIN_FUNCTIONAL_ASSESSMENT: 0-10

## 2025-01-10 LAB
ANION GAP SERPL CALCULATED.3IONS-SCNC: 11 MMOL/L (ref 3–16)
BASE EXCESS BLDA CALC-SCNC: 2 MMOL/L (ref -3–3)
BASE EXCESS BLDV CALC-SCNC: 2 MMOL/L
BASE EXCESS BLDV CALC-SCNC: 2 MMOL/L
BASOPHILS # BLD: 0 K/UL (ref 0–0.2)
BASOPHILS NFR BLD: 0.3 %
BUN SERPL-MCNC: 16 MG/DL (ref 7–20)
CALCIUM SERPL-MCNC: 9.1 MG/DL (ref 8.3–10.6)
CHLORIDE SERPL-SCNC: 100 MMOL/L (ref 99–110)
CO2 BLDA-SCNC: 32.3 MMOL/L
CO2 BLDV-SCNC: 32 MMOL/L
CO2 BLDV-SCNC: 33 MMOL/L
CO2 SERPL-SCNC: 27 MMOL/L (ref 21–32)
COHGB MFR BLDA: 1.1 % (ref 0–1.5)
COHGB MFR BLDV: 1.5 %
COHGB MFR BLDV: 1.6 %
CREAT SERPL-MCNC: 1 MG/DL (ref 0.9–1.3)
DEPRECATED RDW RBC AUTO: 16.6 % (ref 12.4–15.4)
EKG ATRIAL RATE: 109 BPM
EKG DIAGNOSIS: NORMAL
EKG P AXIS: 101 DEGREES
EKG P-R INTERVAL: 142 MS
EKG Q-T INTERVAL: 336 MS
EKG QRS DURATION: 84 MS
EKG QTC CALCULATION (BAZETT): 452 MS
EKG R AXIS: 88 DEGREES
EKG T AXIS: 50 DEGREES
EKG VENTRICULAR RATE: 109 BPM
EOSINOPHIL # BLD: 0 K/UL (ref 0–0.6)
EOSINOPHIL NFR BLD: 0 %
GFR SERPLBLD CREATININE-BSD FMLA CKD-EPI: 88 ML/MIN/{1.73_M2}
GLUCOSE SERPL-MCNC: 153 MG/DL (ref 70–99)
HCO3 BLDA-SCNC: 30.3 MMOL/L (ref 21–29)
HCO3 BLDV-SCNC: 30 MMOL/L (ref 23–29)
HCO3 BLDV-SCNC: 31 MMOL/L (ref 23–29)
HCT VFR BLD AUTO: 40.5 % (ref 40.5–52.5)
HGB BLD-MCNC: 12.9 G/DL (ref 13.5–17.5)
HGB BLDA-MCNC: 13.3 G/DL (ref 13.5–17.5)
LYMPHOCYTES # BLD: 0.2 K/UL (ref 1–5.1)
LYMPHOCYTES NFR BLD: 2.1 %
MCH RBC QN AUTO: 27 PG (ref 26–34)
MCHC RBC AUTO-ENTMCNC: 31.9 G/DL (ref 31–36)
MCV RBC AUTO: 84.6 FL (ref 80–100)
METHGB MFR BLDA: 0.6 %
METHGB MFR BLDV: 0.6 %
METHGB MFR BLDV: 0.7 %
MONOCYTES # BLD: 0.1 K/UL (ref 0–1.3)
MONOCYTES NFR BLD: 0.7 %
NEUTROPHILS # BLD: 11 K/UL (ref 1.7–7.7)
NEUTROPHILS NFR BLD: 96.9 %
O2 THERAPY: ABNORMAL
PCO2 BLDA: 63.6 MMHG (ref 35–45)
PCO2 BLDV: 60.5 MMHG (ref 40–50)
PCO2 BLDV: 67.3 MMHG (ref 40–50)
PH BLDA: 7.29 [PH] (ref 7.35–7.45)
PH BLDV: 7.27 [PH] (ref 7.35–7.45)
PH BLDV: 7.3 [PH] (ref 7.35–7.45)
PHOSPHATE SERPL-MCNC: 3.8 MG/DL (ref 2.5–4.9)
PLATELET # BLD AUTO: 332 K/UL (ref 135–450)
PMV BLD AUTO: 8.7 FL (ref 5–10.5)
PO2 BLDA: 144 MMHG (ref 75–108)
PO2 BLDV: 52 MMHG
PO2 BLDV: 90 MMHG
POTASSIUM SERPL-SCNC: 4.7 MMOL/L (ref 3.5–5.1)
PROCALCITONIN SERPL IA-MCNC: 0.13 NG/ML (ref 0–0.15)
RBC # BLD AUTO: 4.78 M/UL (ref 4.2–5.9)
SAO2 % BLDA: 99.5 %
SAO2 % BLDV: 83 %
SAO2 % BLDV: 97 %
SODIUM SERPL-SCNC: 138 MMOL/L (ref 136–145)
WBC # BLD AUTO: 11.3 K/UL (ref 4–11)

## 2025-01-10 PROCEDURE — 2500000003 HC RX 250 WO HCPCS: Performed by: INTERNAL MEDICINE

## 2025-01-10 PROCEDURE — 94640 AIRWAY INHALATION TREATMENT: CPT

## 2025-01-10 PROCEDURE — 94660 CPAP INITIATION&MGMT: CPT

## 2025-01-10 PROCEDURE — 99223 1ST HOSP IP/OBS HIGH 75: CPT | Performed by: INTERNAL MEDICINE

## 2025-01-10 PROCEDURE — 6370000000 HC RX 637 (ALT 250 FOR IP): Performed by: FAMILY MEDICINE

## 2025-01-10 PROCEDURE — 6370000000 HC RX 637 (ALT 250 FOR IP): Performed by: NURSE PRACTITIONER

## 2025-01-10 PROCEDURE — 80048 BASIC METABOLIC PNL TOTAL CA: CPT

## 2025-01-10 PROCEDURE — 85025 COMPLETE CBC W/AUTO DIFF WBC: CPT

## 2025-01-10 PROCEDURE — 94760 N-INVAS EAR/PLS OXIMETRY 1: CPT

## 2025-01-10 PROCEDURE — 2700000000 HC OXYGEN THERAPY PER DAY

## 2025-01-10 PROCEDURE — 6360000002 HC RX W HCPCS: Performed by: INTERNAL MEDICINE

## 2025-01-10 PROCEDURE — 93010 ELECTROCARDIOGRAM REPORT: CPT | Performed by: INTERNAL MEDICINE

## 2025-01-10 PROCEDURE — 36415 COLL VENOUS BLD VENIPUNCTURE: CPT

## 2025-01-10 PROCEDURE — 36600 WITHDRAWAL OF ARTERIAL BLOOD: CPT

## 2025-01-10 PROCEDURE — 82803 BLOOD GASES ANY COMBINATION: CPT

## 2025-01-10 PROCEDURE — 2060000000 HC ICU INTERMEDIATE R&B

## 2025-01-10 PROCEDURE — 94761 N-INVAS EAR/PLS OXIMETRY MLT: CPT

## 2025-01-10 PROCEDURE — 84100 ASSAY OF PHOSPHORUS: CPT

## 2025-01-10 PROCEDURE — 2500000003 HC RX 250 WO HCPCS

## 2025-01-10 PROCEDURE — 84145 PROCALCITONIN (PCT): CPT

## 2025-01-10 RX ORDER — HYDROXYZINE HYDROCHLORIDE 10 MG/1
1 TABLET, FILM COATED ORAL 3 TIMES DAILY PRN
COMMUNITY
Start: 2024-11-14

## 2025-01-10 RX ORDER — CYCLOBENZAPRINE HCL 10 MG
10 TABLET ORAL 3 TIMES DAILY PRN
Status: DISCONTINUED | OUTPATIENT
Start: 2025-01-10 | End: 2025-01-17 | Stop reason: HOSPADM

## 2025-01-10 RX ORDER — WATER 10 ML/10ML
INJECTION INTRAMUSCULAR; INTRAVENOUS; SUBCUTANEOUS
Status: COMPLETED
Start: 2025-01-10 | End: 2025-01-10

## 2025-01-10 RX ORDER — LOSARTAN POTASSIUM 25 MG/1
25 TABLET ORAL DAILY
Status: DISCONTINUED | OUTPATIENT
Start: 2025-01-10 | End: 2025-01-17 | Stop reason: HOSPADM

## 2025-01-10 RX ORDER — CETIRIZINE HYDROCHLORIDE 10 MG/1
10 TABLET ORAL DAILY
Status: DISCONTINUED | OUTPATIENT
Start: 2025-01-10 | End: 2025-01-17 | Stop reason: HOSPADM

## 2025-01-10 RX ORDER — ASPIRIN 81 MG/1
81 TABLET ORAL DAILY
Status: DISCONTINUED | OUTPATIENT
Start: 2025-01-10 | End: 2025-01-17 | Stop reason: HOSPADM

## 2025-01-10 RX ORDER — PANTOPRAZOLE SODIUM 40 MG/1
40 TABLET, DELAYED RELEASE ORAL
Status: DISCONTINUED | OUTPATIENT
Start: 2025-01-10 | End: 2025-01-17 | Stop reason: HOSPADM

## 2025-01-10 RX ORDER — HYDROXYZINE HYDROCHLORIDE 10 MG/1
10 TABLET, FILM COATED ORAL 3 TIMES DAILY PRN
Status: DISCONTINUED | OUTPATIENT
Start: 2025-01-10 | End: 2025-01-17 | Stop reason: HOSPADM

## 2025-01-10 RX ORDER — ESCITALOPRAM OXALATE 10 MG/1
10 TABLET ORAL DAILY
Status: DISCONTINUED | OUTPATIENT
Start: 2025-01-10 | End: 2025-01-17 | Stop reason: HOSPADM

## 2025-01-10 RX ORDER — AMLODIPINE BESYLATE 10 MG/1
10 TABLET ORAL DAILY
Status: DISCONTINUED | OUTPATIENT
Start: 2025-01-10 | End: 2025-01-17 | Stop reason: HOSPADM

## 2025-01-10 RX ADMIN — IPRATROPIUM BROMIDE AND ALBUTEROL SULFATE 1 DOSE: 2.5; .5 SOLUTION RESPIRATORY (INHALATION) at 11:50

## 2025-01-10 RX ADMIN — ENOXAPARIN SODIUM 30 MG: 100 INJECTION SUBCUTANEOUS at 20:31

## 2025-01-10 RX ADMIN — WATER 10 ML: 1 INJECTION INTRAMUSCULAR; INTRAVENOUS; SUBCUTANEOUS at 05:52

## 2025-01-10 RX ADMIN — WATER 10 ML: 1 INJECTION INTRAMUSCULAR; INTRAVENOUS; SUBCUTANEOUS at 10:21

## 2025-01-10 RX ADMIN — METHYLPREDNISOLONE SODIUM SUCCINATE 40 MG: 40 INJECTION INTRAMUSCULAR; INTRAVENOUS at 05:52

## 2025-01-10 RX ADMIN — METHYLPREDNISOLONE SODIUM SUCCINATE 40 MG: 40 INJECTION INTRAMUSCULAR; INTRAVENOUS at 22:20

## 2025-01-10 RX ADMIN — SODIUM CHLORIDE, PRESERVATIVE FREE 10 ML: 5 INJECTION INTRAVENOUS at 20:36

## 2025-01-10 RX ADMIN — IPRATROPIUM BROMIDE AND ALBUTEROL SULFATE 1 DOSE: 2.5; .5 SOLUTION RESPIRATORY (INHALATION) at 04:15

## 2025-01-10 RX ADMIN — METHYLPREDNISOLONE SODIUM SUCCINATE 40 MG: 40 INJECTION INTRAMUSCULAR; INTRAVENOUS at 17:02

## 2025-01-10 RX ADMIN — IPRATROPIUM BROMIDE AND ALBUTEROL SULFATE 1 DOSE: 2.5; .5 SOLUTION RESPIRATORY (INHALATION) at 15:35

## 2025-01-10 RX ADMIN — SODIUM CHLORIDE, PRESERVATIVE FREE 10 ML: 5 INJECTION INTRAVENOUS at 10:21

## 2025-01-10 RX ADMIN — PANTOPRAZOLE SODIUM 40 MG: 40 TABLET, DELAYED RELEASE ORAL at 10:20

## 2025-01-10 RX ADMIN — ESCITALOPRAM OXALATE 10 MG: 10 TABLET ORAL at 10:20

## 2025-01-10 RX ADMIN — CETIRIZINE HYDROCHLORIDE 10 MG: 10 TABLET, FILM COATED ORAL at 10:20

## 2025-01-10 RX ADMIN — HYDROXYZINE HYDROCHLORIDE 10 MG: 10 TABLET ORAL at 20:32

## 2025-01-10 RX ADMIN — IPRATROPIUM BROMIDE AND ALBUTEROL SULFATE 1 DOSE: 2.5; .5 SOLUTION RESPIRATORY (INHALATION) at 20:02

## 2025-01-10 RX ADMIN — LOSARTAN POTASSIUM 25 MG: 25 TABLET, FILM COATED ORAL at 10:20

## 2025-01-10 RX ADMIN — ALBUTEROL SULFATE 2.5 MG: 2.5 SOLUTION RESPIRATORY (INHALATION) at 01:36

## 2025-01-10 RX ADMIN — METHYLPREDNISOLONE SODIUM SUCCINATE 40 MG: 40 INJECTION INTRAMUSCULAR; INTRAVENOUS at 10:21

## 2025-01-10 RX ADMIN — AMLODIPINE BESYLATE 10 MG: 10 TABLET ORAL at 10:20

## 2025-01-10 RX ADMIN — ASPIRIN 81 MG: 81 TABLET, COATED ORAL at 10:20

## 2025-01-10 RX ADMIN — ENOXAPARIN SODIUM 30 MG: 100 INJECTION SUBCUTANEOUS at 10:20

## 2025-01-10 RX ADMIN — LEVOTHYROXINE SODIUM 175 MCG: 0.12 TABLET ORAL at 10:20

## 2025-01-10 RX ADMIN — IPRATROPIUM BROMIDE AND ALBUTEROL SULFATE 1 DOSE: 2.5; .5 SOLUTION RESPIRATORY (INHALATION) at 23:12

## 2025-01-10 RX ADMIN — IPRATROPIUM BROMIDE AND ALBUTEROL SULFATE 1 DOSE: 2.5; .5 SOLUTION RESPIRATORY (INHALATION) at 09:15

## 2025-01-10 RX ADMIN — CYCLOBENZAPRINE 10 MG: 10 TABLET, FILM COATED ORAL at 20:32

## 2025-01-10 ASSESSMENT — PAIN DESCRIPTION - DESCRIPTORS: DESCRIPTORS: THROBBING

## 2025-01-10 ASSESSMENT — PAIN - FUNCTIONAL ASSESSMENT: PAIN_FUNCTIONAL_ASSESSMENT: ACTIVITIES ARE NOT PREVENTED

## 2025-01-10 ASSESSMENT — PAIN DESCRIPTION - ORIENTATION: ORIENTATION: LOWER

## 2025-01-10 ASSESSMENT — PAIN DESCRIPTION - LOCATION: LOCATION: BACK

## 2025-01-10 ASSESSMENT — PAIN SCALES - GENERAL: PAINLEVEL_OUTOF10: 8

## 2025-01-10 NOTE — ED PROVIDER NOTES
Emergency Department Attending Provider Note  Location: [facility] EMERGENCY DEPARTMENT  1/9/2025   Note Started: 10:49 PM EST 1/9/25     THIS IS MY SUE SUPERVISORY AND SHARED VISIT NOTE:    Patient Identification  Serg Burnette is a 59 y.o. male      HPI:Serg Burnette was evaluated in the Emergency Department for evaluation of shortness of breath that started this morning.  Patient wears 2 L nasal cannula oxygen at baseline for history of COPD.  Patient has been having continuous worsening shortness of breath throughout the day.  EMS brought the patient to the ER and he was in respiratory extremis on arrival.  He was tripoding and sitting on the edge of the bed and unable to relax.  Patient was tolerating his normal 2 L.  He was treated with 125 mg Solu-Medrol en route to the hospital.  He was provided a breathing treatment without relief from his chest tightness.  Patient denies any tobacco use.  States he does have chest pressure when he breathes in and out.  He denies any fevers.  No abdominal pain.  No nausea or vomiting.  States he has been sweaty throughout the day.  States he is also had some lower extremity swelling bilaterally.  Although initial history and physical exam information was obtained by SUE/NPP (who also dictated a record of this visit), I personally saw the patient and made/approved the management plan and take responsibility for the patient management.       PHYSICAL EXAM:     CONSTITUTIONAL: AOx4, ill-appearing, respiratory distress, cooperative with exam, afebrile   HEAD: normocephalic, atraumatic   EYES: PERRL, EOMI, anicteric sclera   ENT: Moist mucous membranes, uvula midline   NECK: Supple, symmetric, trachea midline no stridor   BACK: Symmetric, no deformity   LUNGS: Very diminished breath sounds bilaterally, tachypnea present, retractions present, speaking in short 1-2 word phrases, expiratory wheezing bilaterally   CARDIOVASCULAR: Tachycardic but regular rhythm, normal  MD Reggie, am the primary clinician of record.   I personally saw the patient and independently provided 35 minutes of non-concurrent critical care out of the total shared critical care time provided.    This chart was generated in part by using Dragon Dictation system and may contain errors related to that system including errors in grammar, punctuation, and spelling, as well as words and phrases that may be inappropriate. If there are any questions or concerns please feel free to contact the dictating provider for clarification.     Omar Paredes MD   [employer]        Omar Paredes MD  01/09/25 7399

## 2025-01-10 NOTE — PROGRESS NOTES
Pt transported to room 5111 from ED while on bipap. Pt tolerated transport well without incident. Pt remains on NIV with ordered settings. Will continue to monitor.

## 2025-01-10 NOTE — CONSULTS
REASON FOR CONSULTATION/CC: COPD      Consult at request of Nina Crain MD for     PCP: Catherine Mims APRN - CNP  Established Pulmonologist:  None    HISTORY OF PRESENT ILLNESS: Serg Burnette is a 59 y.o. year old male with a history of  who presents with     Patient came to the emergency room last night secondary to shortness of breath via EMS and was given.  Emergency room patient was given nebulizer and a treated with BiPAP who continued this overnight.  Solu-Medrol 125      This note may have been  transcribed using Dragon Dictation software. Please disregard any translational errors.      Assessment:     COPD Gold stage IV FEV1 20% PI MM  Chronic hypoxemic respiratory failure 2 L nasal cannula  Hypertension  Hyperlipidemia    Plan:      Hospital Day 1     COPD with exacerbation  Acute hypercapnic respiratory failure pH 7.28, pCO2 68  Currently following at [facility].  Referred to [facility] for transplant but has not followed through  Likely a Bandera valve candidate.  Discussed outpatient noninvasive ventilator with the patient.  Outpatient Symbicort and Tudorza  Patient was complaining of substantial shortness of breath.  BiPAP set at a respiratory rate of 16, 16/5.  Minute ventilation 14.  Air trapping noted on the ventilator.  Decrease respiratory rate down to 5.  Increased EPAP up to 8.  With this, air trapping was noted to decrease over time.  Repeat blood gas ordered.  With worsening pH, discussed concern for intubation.  Patient and wife expressed understanding.           This note was transcribed using Dragon Dictation software. Please disregard any translational errors.    Thank you for the consult    SOFIE ALVARADO   [facility] Pulmonary, Sleep and Critical Care  [phone]             Data:     PAST MEDICAL HISTORY:  Past Medical History:   Diagnosis Date    COPD (chronic obstructive pulmonary disease) (HCC)     Hyperlipidemia     Hypertension        PAST SURGICAL

## 2025-01-10 NOTE — H&P
V2.0  History and Physical      Name:  Serg Burnette /Age/Sex: 1965  (59 y.o. male)   MRN & CSN:  [id number] & [id number] Encounter Date/Time: 2025 9:59 PM EST   [facility] PCP: Catherine Mims APRN - CNP       Hospital Day: 1    Assessment and Plan:   Serg Burnette is a 59 y.o. obese male smoker in remission with a pmh of COPD, essential hypertension, hypercholesterolemia who presents with COPD exacerbation (HCC).    Hospital Problems             Last Modified POA    * (Principal) COPD exacerbation (HCC) 2025 Yes    Acute respiratory failure with hypoxia and hypercapnia 2025 Yes    Essential hypertension 2025 Yes       Plan:  Empiric azithromycin, bronchodilators and systemic steroids, check Legionella and strep urine antigens, pulmonary consult  Continue BiPAP, repeat VBG to assess progress  Resume home regimen for chronic stable conditions    Disposition:   Current Living situation: Home  Expected Disposition: Home  Estimated D/C: 3 to 4 days    Diet ADULT DIET; Regular; 5 carb choices (75 gm/meal); Low Fat/Low Chol/High Fiber/MADELYN   DVT Prophylaxis [x] Lovenox, []  Heparin, [] SCDs, [] Ambulation,  [] Eliquis, [] Xarelto, [] Coumadin   Code Status Full Code   Surrogate Decision Maker/ POA Wife     Personally reviewed Lab Studies and Imaging     Discussed management of the case with ED provider who recommended admission.    EKG interpreted personally and results none done.    Imaging that was interpreted personally includes chest x-ray and results negative for any acute cardiopulmonary process.    Drugs that require monitoring for toxicity include none and the method of monitoring was n/a.        History from:     patient and wife    History of Present Illness:     Chief Complaint: Shortness of breath  Serg Burnette is a 59 y.o. obese male smoker in remission with pmh of COPD, essential hypertension, hypercholesterolemia who presents with increased shortness of breath which  Cultures: No results found for: \"LABURIN\"  Blood Cultures: No results found for: \"BC\"  No results found for: \"BLOODCULT2\"  Organism:   Lab Results   Component Value Date/Time    ORG Moraxella catarrhalis BY DNA 10/04/2024 11:10 AM    ORG Respiratory syncytial virus by PCR 10/04/2024 11:10 AM       Imaging/Diagnostics Last 24 Hours   XR CHEST PORTABLE    Result Date: 1/9/2025  EXAMINATION: ONE XRAY VIEW OF THE CHEST 1/9/2025 8:10 pm COMPARISON: 10/02/2024. HISTORY: ORDERING SYSTEM PROVIDED HISTORY: SOB TECHNOLOGIST PROVIDED HISTORY: Reason for exam:->SOB Reason for Exam: sob FINDINGS: Overlying items external to the patient somewhat limit evaluation. Mild bibasilar linear scarring versus atelectasis, left more than right.  No focal consolidations, pleural effusions or pulmonary edema.  No pneumothoraces.  Cardiac and mediastinal silhouettes are stable.  No acute osseous abnormality.     No acute abnormality.         Electronically signed by Lashon Solitario MD on 1/9/2025 at 9:59 PM

## 2025-01-10 NOTE — ED PROVIDER NOTES
[facility] EMERGENCY DEPARTMENT  EMERGENCY DEPARTMENT ENCOUNTER        Pt Name: Serg Burnette  MRN: [id number]  Birthdate 1965  Date of evaluation: 1/9/2025  Provider: JOSE MIGUEL Cisneros CNP  PCP: Catherine Mims APRN - CNP  Note Started: 7:47 PM EST 1/9/25       I have seen and evaluated this patient with my supervising physician Omar Paredes MD.      CHIEF COMPLAINT       Chief Complaint   Patient presents with    Shortness of Breath     SOB started this AM. Pt wears 2L baseline continuous. Squad gave solumedrol 125 orally. His home inhaler wasn't working. He doesn't drink, smoke, or do recreational drubs. Chest pressure when he breathes in and out        HISTORY OF PRESENT ILLNESS: 1 or more Elements     History from : Patient and EMS    Limitations to history : None    Serg Burnette is a 59 y.o. male with PMH significant for  oxygen dependent emphysema, HTN, and HLD who presents to the emergency department today complaining of shortness of breath.  Onset was this morning.  The context was that he woke up feeling bad.  No fevers or congestion, just feels like he cannot catch his breath.  No productive cough.  He advised he does not use his nebulizers at home because it makes his shortness of breath worse.  He was given 1 breathing treatment and Solu-Medrol by EMS.  He has been using his maintenance hand inhalers at home as prescribed.  Patient very tachypneic and dyspneic on arrival, and shortly after arrival was placed on BiPAP due to significant respiratory distress    Nursing Notes were all reviewed and agreed with or any disagreements were addressed in the HPI.    REVIEW OF SYSTEMS :      Review of Systems   Constitutional:  Negative for chills, diaphoresis and fever.   HENT: Negative.  Negative for congestion, rhinorrhea, sinus pressure, sinus pain and sore throat.    Eyes:  Negative for visual disturbance.   Respiratory:  Positive for cough, chest tightness and shortness of  requiring emergent BiPAP      PAST MEDICAL HISTORY      has a past medical history of COPD (chronic obstructive pulmonary disease) (HCC), Hyperlipidemia, and Hypertension.     Chronic Conditions affecting Care:     EMERGENCY DEPARTMENT COURSE and DIFFERENTIAL DIAGNOSIS/MDM:   Vitals:    Vitals:    01/09/25 2003 01/09/25 2023 01/09/25 2248 01/09/25 2339   BP:    (!) 141/87   Pulse: (!) 122 (!) 127 (!) 117 (!) 118   Resp: 19 18 16 16   Temp:       TempSrc:       SpO2: 99% 100% 100% 99%       Patient was given the following medications:  Medications   sodium chloride flush 0.9 % injection 5-40 mL (10 mLs IntraVENous Given 1/9/25 2241)   sodium chloride flush 0.9 % injection 5-40 mL (has no administration in time range)   0.9 % sodium chloride infusion (has no administration in time range)   ondansetron (ZOFRAN-ODT) disintegrating tablet 4 mg (has no administration in time range)     Or   ondansetron (ZOFRAN) injection 4 mg (has no administration in time range)   polyethylene glycol (GLYCOLAX) packet 17 g (has no administration in time range)   enoxaparin Sodium (LOVENOX) injection 30 mg (has no administration in time range)   acetaminophen (TYLENOL) tablet 650 mg (has no administration in time range)     Or   acetaminophen (TYLENOL) suppository 650 mg (has no administration in time range)   aluminum & magnesium hydroxide-simethicone (MAALOX) 200-200-20 MG/5ML suspension 30 mL (has no administration in time range)   albuterol (PROVENTIL) (2.5 MG/3ML) 0.083% nebulizer solution 2.5 mg (has no administration in time range)   guaiFENesin-dextromethorphan (ROBITUSSIN DM) 100-10 MG/5ML syrup 5 mL (has no administration in time range)   methylPREDNISolone sodium succ (SOLU-MEDROL) injection 40 mg (40 mg IntraVENous Given 1/9/25 2324)     Followed by   predniSONE (DELTASONE) tablet 40 mg (has no administration in time range)   ipratropium 0.5 mg-albuterol 2.5 mg (DUONEB) nebulizer solution 1 Dose (1 Dose Inhalation Given 1/9/25  2339)   ipratropium 0.5 mg-albuterol 2.5 mg (DUONEB) nebulizer solution 1 Dose (1 Dose Inhalation Given 1/9/25 1955)   albuterol (PROVENTIL) (2.5 MG/3ML) 0.083% nebulizer solution 5 mg (5 mg Nebulization Given 1/9/25 1955)   azithromycin (ZITHROMAX) 500 mg in 250 mL addavial (0 mg IntraVENous Stopped 1/9/25 2320)   acetaminophen (TYLENOL) tablet 1,000 mg (1,000 mg Oral Given 1/9/25 2326)   magnesium sulfate 2000 mg in 50 mL IVPB premix (0 mg IntraVENous Stopped 1/9/25 2346)             Is this patient to be included in the SEP-1 Core Measure due to severe sepsis or septic shock?   No   Exclusion criteria - the patient is NOT to be included for SEP-1 Core Measure due to:  Infection is not suspected    CONSULTS: (Who and What was discussed)  IP CONSULT TO PULMONOLOGY  Discussion with Other Profesionals : Admitting Team      Social Determinants : None    Records Reviewed : None      Differential Diagnosis: Acute Coronary Syndrome, Congestive Heart Failure, Myocardial Infarction, Pulmonary Embolus, Pneumonia, Pneumothorax, other    Patient presents in significant respiratory distress.  See HPI for full presentation.  Physical exam as above.  59-year-old tachypneic  and tachycardic with wheezing and respiratory distress.  Respiratory called to bedside on EMS arrival.  Breathing treatments were attempted, but patient quickly ended up on BiPAP and tolerated that very well.  Initial VBG shows a pH of 7.33 with a pCO2 of 57.  Lactic acid normal.  Troponin and BNP negative.  CBC and chemistry grossly unremarkable.  Repeat lactic acid shows increasing pCO2 is 68 with a worsening pH of 7.28.  Chest x-ray normal.  Flu COVID and RSV negative.      Emergency department course included breathing treatments and steroids as well as BiPAP.  He was given azithromycin for COPD exacerbation.  Patient feels much better after 3 hours in the emergency department.  Despite his VBG worsening, this could be just him peaking.  RT did try to

## 2025-01-10 NOTE — PLAN OF CARE
Problem: Discharge Planning  Goal: Discharge to home or other facility with appropriate resources  Outcome: Progressing  Flowsheets (Taken 1/10/2025 0334)  Discharge to home or other facility with appropriate resources:   Identify barriers to discharge with patient and caregiver   Arrange for needed discharge resources and transportation as appropriate     Problem: Discharge Planning  Goal: Discharge to home or other facility with appropriate resources  Outcome: Progressing  Flowsheets (Taken 1/10/2025 0334)  Discharge to home or other facility with appropriate resources:   Identify barriers to discharge with patient and caregiver   Arrange for needed discharge resources and transportation as appropriate     Problem: Pain  Goal: Verbalizes/displays adequate comfort level or baseline comfort level  1/10/2025 0424 by Socorro Cuello, RN  Outcome: Progressing     Problem: Safety - Adult  Goal: Free from fall injury  1/10/2025 0424 by Socorro Cuello, RN  Outcome: Progressing     Problem: Respiratory - Adult  Goal: Achieves optimal ventilation and oxygenation  Outcome: Progressing     Problem: Hematologic - Adult  Goal: Maintains hematologic stability  Outcome: Progressing

## 2025-01-10 NOTE — PROGRESS NOTES
Pt arrived to room 5111 via stretcher from ED and transferred to bed. Bedside telemetry activated and continuous pulse oximetry applied. Confirmed with CMU. Pt on continuous BiPAP. Tolerating well. VSS, see flowsheet. Patient oriented to room and use of call light. Call light and personal items within reach. Admission and assessment initiated. POC and education initiated and reviewed with patient and wife at bedside. Care ongoing.     Electronically signed by Socorro Cuello RN on 1/10/2025 at 3:53 AM

## 2025-01-10 NOTE — PROGRESS NOTES
VBG results noted. Attempted to adjust bipap settings. Pt did not tolerate increase in settings. Pt comfortable with use of current settings and allowing time to decrease noted Co2 levels. Will continue to monitor.

## 2025-01-10 NOTE — RT PROTOCOL NOTE
RT Inhaler-Nebulizer Bronchodilator Protocol Note    There is a bronchodilator order in the chart from a provider indicating to follow the RT Bronchodilator Protocol and there is an “Initiate RT Inhaler-Nebulizer Bronchodilator Protocol” order as well (see protocol at bottom of note).    CXR Findings:  XR CHEST PORTABLE    Result Date: 1/9/2025  No acute abnormality.       The findings from the last RT Protocol Assessment were as follows:   History Pulmonary Disease: Chronic pulmonary disease  Respiratory Pattern: Mild dyspnea at rest, irregular pattern, or RR 21-25 bpm  Breath Sounds: Severe inspiratory and expiratory wheezing or severely diminished  Cough: Strong, spontaneous, non-productive  Indication for Bronchodilator Therapy: Wheezing associated with pulm disorder  Bronchodilator Assessment Score: 14    Aerosolized bronchodilator medication orders have been revised according to the RT Inhaler-Nebulizer Bronchodilator Protocol below.    Respiratory Therapist to perform RT Therapy Protocol Assessment initially then follow the protocol.  Repeat RT Therapy Protocol Assessment PRN for score 0-3 or on second treatment, BID, and PRN for scores above 3.    No Indications - adjust the frequency to every 6 hours PRN wheezing or bronchospasm, if no treatments needed after 48 hours then discontinue using Per Protocol order mode.     If indication present, adjust the RT bronchodilator orders based on the Bronchodilator Assessment Score as indicated below.  Use Inhaler orders unless patient has one or more of the following: on home nebulizer, not able to hold breath for 10 seconds, is not alert and oriented, cannot activate and use MDI correctly, or respiratory rate 25 breaths per minute or more, then use the equivalent nebulizer order(s) with same Frequency and PRN reasons based on the score.  If a patient is on this medication at home then do not decrease Frequency below that used at home.    0-3 - enter or revise RT

## 2025-01-10 NOTE — PROGRESS NOTES
[place]2.[place]    [facility] [place]      Name:  Serg Burnette /Age/Sex: 1965  (59 y.o. male)   MRN & CSN:  [id number] & [id number] Encounter Date/Time: 1/10/2025 11:23 AM EST   Location:  [id number] PCP: Catherine Mims APRN - CNP     Attending:Nina Crain MD       Hospital Day: 2    Assessment and Recommendations   Serg Burnette is a 59 y.o. male with pmh of COPD, HLD, HTN who presents with acute respiratory failure most likely due to COPD exacerbation (HCC)    Patient was examined this morning.  Patient is on continuous BiPAP.  Wife is at the bedside  Patient states that symptoms started yesterday and got worse with his shortness of breath so he presented to the ED for further management.  Pulmonology was consulted, very cautious about possible intubation due to patient's constant need for BiPAP, wife and patient are in agreement if needed.      Plan:   Acute respiratory failure  COPD exacerbation  Azithromycin 500 mg IV daily  Budesonide breathing treatments  Systemic steroids  Continuous BiPAP  Pulmonology consult    2.  Hypertension  Patient is currently on amlodipine 10 mg, losartan 25 mg  Will follow vitals for further management if needed    3.  Hypothyroidism  Synthroid 175 mcg daily    Will continue to follow, monitor and manage chronic medical conditions with medication listed below      Due to the immediate potential for life-threatening deterioration. I spent 31 minutes providing critical care. There was a high probability of clinically significant/life threatening deterioration in the patient's condition which required my urgent intervention. This time excludes time spent performing procedures but includes time spent on direct patient care, history retrieval, review of the chart and discussions with patient, and consultant(s)  Diet ADULT DIET; Regular; 5 carb choices (75 gm/meal); Low Fat/Low Chol/High Fiber/MADELYN   DVT Prophylaxis [x] Lovenox, []  Heparin, [] SCDs, [] Ambulation,  []  range of motion without deformity.  Skin: Skin color, texture, turgor normal.  No rashes or lesions.  Neurologic:  Neurovascularly intact  Psychiatric: Alert and oriented  Capillary Refill: Brisk, 3 seconds, normal   Peripheral Pulses: +2 palpable, equal bilaterally       Medications:   Medications:    amLODIPine  10 mg Oral Daily    aspirin  81 mg Oral Daily    cetirizine  10 mg Oral Daily    escitalopram  10 mg Oral Daily    levothyroxine  175 mcg Oral Daily    losartan  25 mg Oral Daily    pantoprazole  40 mg Oral QAM AC    sodium chloride flush  5-40 mL IntraVENous 2 times per day    enoxaparin  30 mg SubCUTAneous BID    methylPREDNISolone  40 mg IntraVENous Q6H    Followed by    [START ON 1/12/2025] predniSONE  40 mg Oral Daily    ipratropium 0.5 mg-albuterol 2.5 mg  1 Dose Inhalation Q4H RT      Infusions:    sodium chloride       PRN Meds: cyclobenzaprine, 10 mg, TID PRN  hydrOXYzine HCl, 10 mg, TID PRN  sodium chloride flush, 5-40 mL, PRN  sodium chloride, , PRN  ondansetron, 4 mg, Q8H PRN   Or  ondansetron, 4 mg, Q6H PRN  polyethylene glycol, 17 g, Daily PRN  acetaminophen, 650 mg, Q6H PRN   Or  acetaminophen, 650 mg, Q6H PRN  aluminum & magnesium hydroxide-simethicone, 30 mL, Q6H PRN  albuterol, 2.5 mg, Q2H PRN  guaiFENesin-dextromethorphan, 5 mL, Q4H PRN        Labs and Imaging   XR CHEST PORTABLE    Result Date: 1/9/2025  EXAMINATION: ONE XRAY VIEW OF THE CHEST 1/9/2025 8:10 pm COMPARISON: 10/02/2024. HISTORY: ORDERING SYSTEM PROVIDED HISTORY: SOB TECHNOLOGIST PROVIDED HISTORY: Reason for exam:->SOB Reason for Exam: sob FINDINGS: Overlying items external to the patient somewhat limit evaluation. Mild bibasilar linear scarring versus atelectasis, left more than right.  No focal consolidations, pleural effusions or pulmonary edema.  No pneumothoraces.  Cardiac and mediastinal silhouettes are stable.  No acute osseous abnormality.     No acute abnormality.       CBC:   Recent Labs     01/09/25 1957  01/10/25  0455   WBC 12.0* 11.3*   HGB 13.6 12.9*    332     BMP:    Recent Labs     01/09/25  1957 01/10/25  0454    138   K 4.1 4.7    100   CO2 27 27   BUN 17 16   CREATININE 1.0 1.0   GLUCOSE 104* 153*     Hepatic:   Recent Labs     01/09/25 1957   AST 19   ALT 14   BILITOT 0.5   ALKPHOS 91     Lipids: No results found for: \"CHOL\", \"HDL\", \"TRIG\"  Hemoglobin A1C: No results found for: \"LABA1C\"  TSH: No results found for: \"TSH\"  Troponin: No results found for: \"TROPONINT\"  Lactic Acid:   Recent Labs     01/09/25 1957   LACTA 1.3     BNP:   Recent Labs     01/09/25 1957   PROBNP <36     UA:No results found for: \"NITRU\", \"COLORU\", \"PHUR\", \"LABCAST\", \"WBCUA\", \"RBCUA\", \"MUCUS\", \"TRICHOMONAS\", \"YEAST\", \"BACTERIA\", \"CLARITYU\", \"SPECGRAV\", \"LEUKOCYTESUR\", \"UROBILINOGEN\", \"BILIRUBINUR\", \"BLOODU\", \"GLUCOSEU\", \"KETUA\", \"AMORPHOUS\"  Urine Cultures: No results found for: \"LABURIN\"  Blood Cultures: No results found for: \"BC\"  No results found for: \"BLOODCULT2\"  Organism:   Lab Results   Component Value Date/Time    ORG Moraxella catarrhalis BY DNA 10/04/2024 11:10 AM    ORG Respiratory syncytial virus by PCR 10/04/2024 11:10 AM         Electronically signed by Nina Crain MD on 1/10/2025 at 11:23 AM  Comment: Please note this report has been produced using speech recognition software and may contain errors related to that system including errors in grammar, punctuation, and spelling, as well as words and phrases that may be inappropriate. If there are any questions or concerns, please feel free to contact the dictating provider for clarification.

## 2025-01-10 NOTE — PROGRESS NOTES
4 Eyes Skin Assessment     NAME:  Serg Burnette  YOB: 1965  MEDICAL RECORD NUMBER:  [id number]    The patient is being assessed for  Admission    I agree that at least one RN has performed a thorough Head to Toe Skin Assessment on the patient. ALL assessment sites listed below have been assessed.      Areas assessed by both nurses:    Head, Face, Ears, Shoulders, Back, Chest, Arms, Elbows, Hands, Sacrum. Buttock, Coccyx, Ischium, Legs. Feet and Heels, and Under Medical Devices         Does the Patient have a Wound? No noted wound(s)       Gopi Prevention initiated by RN: No  Wound Care Orders initiated by RN: No    Pressure Injury (Stage 3,4, Unstageable, DTI, NWPT, and Complex wounds) if present, place Wound referral order by RN under : No    New Ostomies, if present place, Ostomy referral order under : No     Nurse 1 eSignature: Electronically signed by Socorro Cuello RN on 1/10/25 at 3:51 AM EST    **SHARE this note so that the co-signing nurse can place an eSignature**    Nurse 2 eSignature: Electronically signed by Elle Guillen RN on 1/10/25 at 3:54 AM EST

## 2025-01-10 NOTE — PROGRESS NOTES
[facility]  Respiratory Therapy  Non-Invasive Ventilation    Name:  Serg Burnette  Medical Record Number:  [id number]  Age: 59 y.o.   : 1965  Today's Date:  2025  Room:  [address] [place] [address]      Assessment      BP (!) 170/90   Pulse (!) 127   Temp 99.6 °F (37.6 °C) (Oral)   Resp 18   SpO2 100%     Patient Active Problem List   Diagnosis    COPD exacerbation (HCC)    Chest pain    Acute respiratory failure with hypoxia and hypercapnia    Essential hypertension       Social History  Social History     Tobacco Use    Smoking status: Former   Substance Use Topics    Alcohol use: No    Drug use: No         Set-up patient on V60 BIPAP unit. Settings per MD and titrated for patient comfort. Patient tolerating well. No complications noted at this time.        Patient/caregiver was educated on the proper method of use:  Yes      Level of patient/caregiver understanding able to:   [x] Verbalize understanding   [] Demonstrate understanding       [] Teach back        [] Needs reinforcement        []  No available caregiver               []  Other:     Response to education:  Excellent     Teaching Time:  5  minutes     Electronically signed by Rosalva Burnette RCP on 2025 at 10:49 PM

## 2025-01-10 NOTE — PROGRESS NOTES
Patient wore Bipap throughout day. Decreased FiO2 to 30%. Patient tolerating settings well. Encouraged bipap tonight.

## 2025-01-11 LAB
BASE EXCESS BLDV CALC-SCNC: 3.6 MMOL/L
CO2 BLDV-SCNC: 34 MMOL/L
COHGB MFR BLDV: 1.3 %
DEPRECATED RDW RBC AUTO: 16.3 % (ref 12.4–15.4)
HCO3 BLDV-SCNC: 32 MMOL/L (ref 23–29)
HCT VFR BLD AUTO: 40.5 % (ref 40.5–52.5)
HGB BLD-MCNC: 12.8 G/DL (ref 13.5–17.5)
MCH RBC QN AUTO: 27 PG (ref 26–34)
MCHC RBC AUTO-ENTMCNC: 31.7 G/DL (ref 31–36)
MCV RBC AUTO: 85.2 FL (ref 80–100)
METHGB MFR BLDV: 0.5 %
O2 THERAPY: ABNORMAL
PCO2 BLDV: 63.3 MMHG (ref 40–50)
PH BLDV: 7.31 [PH] (ref 7.35–7.45)
PLATELET # BLD AUTO: 353 K/UL (ref 135–450)
PMV BLD AUTO: 8.6 FL (ref 5–10.5)
PO2 BLDV: 80 MMHG
RBC # BLD AUTO: 4.75 M/UL (ref 4.2–5.9)
SAO2 % BLDV: 96 %
WBC # BLD AUTO: 13.5 K/UL (ref 4–11)

## 2025-01-11 PROCEDURE — 6370000000 HC RX 637 (ALT 250 FOR IP): Performed by: FAMILY MEDICINE

## 2025-01-11 PROCEDURE — 2700000000 HC OXYGEN THERAPY PER DAY

## 2025-01-11 PROCEDURE — 85027 COMPLETE CBC AUTOMATED: CPT

## 2025-01-11 PROCEDURE — 6360000002 HC RX W HCPCS: Performed by: INTERNAL MEDICINE

## 2025-01-11 PROCEDURE — 82803 BLOOD GASES ANY COMBINATION: CPT

## 2025-01-11 PROCEDURE — 94640 AIRWAY INHALATION TREATMENT: CPT

## 2025-01-11 PROCEDURE — 94761 N-INVAS EAR/PLS OXIMETRY MLT: CPT

## 2025-01-11 PROCEDURE — 94660 CPAP INITIATION&MGMT: CPT

## 2025-01-11 PROCEDURE — 2500000003 HC RX 250 WO HCPCS: Performed by: INTERNAL MEDICINE

## 2025-01-11 PROCEDURE — 36415 COLL VENOUS BLD VENIPUNCTURE: CPT

## 2025-01-11 PROCEDURE — 2580000003 HC RX 258: Performed by: FAMILY MEDICINE

## 2025-01-11 PROCEDURE — 6370000000 HC RX 637 (ALT 250 FOR IP): Performed by: NURSE PRACTITIONER

## 2025-01-11 PROCEDURE — 6370000000 HC RX 637 (ALT 250 FOR IP): Performed by: INTERNAL MEDICINE

## 2025-01-11 PROCEDURE — 6360000002 HC RX W HCPCS: Performed by: FAMILY MEDICINE

## 2025-01-11 PROCEDURE — 99233 SBSQ HOSP IP/OBS HIGH 50: CPT | Performed by: INTERNAL MEDICINE

## 2025-01-11 PROCEDURE — 2060000000 HC ICU INTERMEDIATE R&B

## 2025-01-11 RX ORDER — BUDESONIDE AND FORMOTEROL FUMARATE DIHYDRATE 160; 4.5 UG/1; UG/1
2 AEROSOL RESPIRATORY (INHALATION)
Status: DISCONTINUED | OUTPATIENT
Start: 2025-01-11 | End: 2025-01-17 | Stop reason: HOSPADM

## 2025-01-11 RX ADMIN — Medication 2 PUFF: at 19:39

## 2025-01-11 RX ADMIN — IPRATROPIUM BROMIDE AND ALBUTEROL SULFATE 1 DOSE: 2.5; .5 SOLUTION RESPIRATORY (INHALATION) at 08:13

## 2025-01-11 RX ADMIN — HYDROXYZINE HYDROCHLORIDE 10 MG: 10 TABLET ORAL at 06:57

## 2025-01-11 RX ADMIN — IPRATROPIUM BROMIDE AND ALBUTEROL SULFATE 1 DOSE: 2.5; .5 SOLUTION RESPIRATORY (INHALATION) at 03:30

## 2025-01-11 RX ADMIN — AZITHROMYCIN MONOHYDRATE 500 MG: 500 INJECTION, POWDER, LYOPHILIZED, FOR SOLUTION INTRAVENOUS at 10:29

## 2025-01-11 RX ADMIN — PANTOPRAZOLE SODIUM 40 MG: 40 TABLET, DELAYED RELEASE ORAL at 06:57

## 2025-01-11 RX ADMIN — IPRATROPIUM BROMIDE AND ALBUTEROL SULFATE 1 DOSE: 2.5; .5 SOLUTION RESPIRATORY (INHALATION) at 10:57

## 2025-01-11 RX ADMIN — CETIRIZINE HYDROCHLORIDE 10 MG: 10 TABLET, FILM COATED ORAL at 08:34

## 2025-01-11 RX ADMIN — CYCLOBENZAPRINE 10 MG: 10 TABLET, FILM COATED ORAL at 21:55

## 2025-01-11 RX ADMIN — METHYLPREDNISOLONE SODIUM SUCCINATE 40 MG: 40 INJECTION INTRAMUSCULAR; INTRAVENOUS at 04:21

## 2025-01-11 RX ADMIN — IPRATROPIUM BROMIDE AND ALBUTEROL SULFATE 1 DOSE: 2.5; .5 SOLUTION RESPIRATORY (INHALATION) at 23:53

## 2025-01-11 RX ADMIN — LEVOTHYROXINE SODIUM 175 MCG: 0.12 TABLET ORAL at 08:35

## 2025-01-11 RX ADMIN — SODIUM CHLORIDE, PRESERVATIVE FREE 10 ML: 5 INJECTION INTRAVENOUS at 21:56

## 2025-01-11 RX ADMIN — IPRATROPIUM BROMIDE AND ALBUTEROL SULFATE 1 DOSE: 2.5; .5 SOLUTION RESPIRATORY (INHALATION) at 19:39

## 2025-01-11 RX ADMIN — ASPIRIN 81 MG: 81 TABLET, COATED ORAL at 08:35

## 2025-01-11 RX ADMIN — ENOXAPARIN SODIUM 30 MG: 100 INJECTION SUBCUTANEOUS at 08:34

## 2025-01-11 RX ADMIN — LOSARTAN POTASSIUM 25 MG: 25 TABLET, FILM COATED ORAL at 08:35

## 2025-01-11 RX ADMIN — METHYLPREDNISOLONE SODIUM SUCCINATE 40 MG: 40 INJECTION INTRAMUSCULAR; INTRAVENOUS at 08:34

## 2025-01-11 RX ADMIN — SODIUM CHLORIDE, PRESERVATIVE FREE 10 ML: 5 INJECTION INTRAVENOUS at 08:36

## 2025-01-11 RX ADMIN — HYDROXYZINE HYDROCHLORIDE 10 MG: 10 TABLET ORAL at 17:18

## 2025-01-11 RX ADMIN — AMLODIPINE BESYLATE 10 MG: 10 TABLET ORAL at 08:35

## 2025-01-11 RX ADMIN — METHYLPREDNISOLONE SODIUM SUCCINATE 40 MG: 40 INJECTION INTRAMUSCULAR; INTRAVENOUS at 17:15

## 2025-01-11 RX ADMIN — Medication 2 PUFF: at 11:09

## 2025-01-11 RX ADMIN — ENOXAPARIN SODIUM 30 MG: 100 INJECTION SUBCUTANEOUS at 21:55

## 2025-01-11 RX ADMIN — IPRATROPIUM BROMIDE AND ALBUTEROL SULFATE 1 DOSE: 2.5; .5 SOLUTION RESPIRATORY (INHALATION) at 15:51

## 2025-01-11 RX ADMIN — ESCITALOPRAM OXALATE 10 MG: 10 TABLET ORAL at 08:35

## 2025-01-11 ASSESSMENT — PAIN DESCRIPTION - ORIENTATION: ORIENTATION: LOWER

## 2025-01-11 ASSESSMENT — PAIN SCALES - GENERAL: PAINLEVEL_OUTOF10: 6

## 2025-01-11 ASSESSMENT — PAIN DESCRIPTION - DESCRIPTORS: DESCRIPTORS: ACHING

## 2025-01-11 ASSESSMENT — PAIN - FUNCTIONAL ASSESSMENT: PAIN_FUNCTIONAL_ASSESSMENT: ACTIVITIES ARE NOT PREVENTED

## 2025-01-11 ASSESSMENT — PAIN DESCRIPTION - LOCATION: LOCATION: BACK

## 2025-01-11 NOTE — PROGRESS NOTES
V2.0    [facility] [place]      Name:  Serg Burnette /Age/Sex: 1965  (59 y.o. male)   MRN & CSN:  [id number] & [id number] Encounter Date/Time: 2025 11:23 AM EST   Location:  [id number] PCP: Catherine Mims APRN - CNP     Attending:Nina Crain MD       Hospital Day: 3    Assessment and Recommendations   Serg Burnette is a 59 y.o. male with pmh of COPD, HLD, HTN who presents with COPD exacerbation (HCC)    Patient was examined this morning.  Patient is on continuous BiPAP.  Wife is at the bedside  Patient feels mild improvement of his shortness of breath from his presentation.  Continues to be on continuous BiPAP  Pulmonology on board recommending BiPAP intermediately throughout the day and continuous at night, continue with systemic steroids, DuoNeb and Symbicort, and azithromycin for 3 days  Remains very high risk for intubation      Plan:   COPD exacerbation  Azithromycin 500 mg IV daily  Budesonide breathing treatments  Systemic steroids  Continuous BiPAP  Pulmonology consult    2.  Hypertension  Patient is currently on amlodipine 10 mg, losartan 25 mg  Will follow vitals for further management if needed    3.  Hypothyroidism  Synthroid 175 mcg daily    Will continue to follow, monitor and manage chronic medical conditions with medication listed below    Due to the immediate potential for life-threatening deterioration. I spent 31 minutes providing critical care. There was a high probability of clinically significant/life threatening deterioration in the patient's condition which required my urgent intervention. This time excludes time spent performing procedures but includes time spent on direct patient care, history retrieval, review of the chart and discussions with patient, and consultant(s)  Diet ADULT DIET; Regular; 5 carb choices (75 gm/meal); Low Fat/Low Chol/High Fiber/MADELYN   DVT Prophylaxis [x] Lovenox, []  Heparin, [] SCDs, [] Ambulation,  [] Eliquis, [] Xarelto  []  Coumadin   Code Status Full Code   Disposition From: Home  Expected Disposition: Home  Estimated Date of Discharge: 1-2 days  Patient requires continued admission due to upon clinical improvement   Surrogate Decision Maker/ POA Self     Personally reviewed Lab Studies and Imaging       Medical Decision Making:  The following items were considered in medical decision making:  Discussion of patient care with other providers  Reviewed clinical lab tests  Reviewed radiology tests  Reviewed other diagnostic tests/interventions  Independent review of radiologic images  Microbiology cultures and other micro tests reviewed      Subjective:     Chief Complaint: Shortness of breath    Serg Burnette is a 59 y.o. male who presents with shortness of breath      Review of Systems:      Pertinent positives and negatives discussed in HPI    Objective:     Intake/Output Summary (Last 24 hours) at 1/11/2025 1252  Last data filed at 1/10/2025 1400  Gross per 24 hour   Intake 120 ml   Output --   Net 120 ml      Vitals:   Vitals:    01/11/25 0813 01/11/25 0815 01/11/25 1059 01/11/25 1106   BP:       Pulse: 97 97 (!) 105    Resp:       Temp:       TempSrc:       SpO2: 98% 97% 100% 100%   Weight:       Height:             Physical Exam:      Physical Exam Performed:    BP (!) 167/72   Pulse (!) 105   Temp 98 °F (36.7 °C)   Resp 19   Ht 1.651 m (5' 5\")   Wt 101.4 kg (223 lb 8.7 oz)   SpO2 100%   BMI 37.20 kg/m²     General appearance: No apparent distress, cooperative, while on continuous BiPAP.  HEENT: Pupils equal, round, and reactive to light.   Neck: Supple, with full range of motion. Trachea midline.  Respiratory:  Normal respiratory effort.  Rhonchi throughout  Cardiovascular: Regular rate and rhythm with normal S1/S2   Abdomen: Soft, non-tender, non-distended with normal bowel sounds.  Musculoskeletal: No edema bilaterally.  Full range of motion without deformity.  Skin: Skin color, texture, turgor normal.  No rashes or  lesions.  Neurologic:  Neurovascularly intact  Psychiatric: Alert and oriented  Capillary Refill: Brisk, 3 seconds, normal   Peripheral Pulses: +2 palpable, equal bilaterally       Medications:   Medications:    azithromycin  500 mg IntraVENous Q24H    budesonide-formoterol  2 puff Inhalation BID RT    amLODIPine  10 mg Oral Daily    aspirin  81 mg Oral Daily    cetirizine  10 mg Oral Daily    escitalopram  10 mg Oral Daily    levothyroxine  175 mcg Oral Daily    losartan  25 mg Oral Daily    pantoprazole  40 mg Oral QAM AC    sodium chloride flush  5-40 mL IntraVENous 2 times per day    enoxaparin  30 mg SubCUTAneous BID    methylPREDNISolone  40 mg IntraVENous Q6H    Followed by    [START ON 1/12/2025] predniSONE  40 mg Oral Daily    ipratropium 0.5 mg-albuterol 2.5 mg  1 Dose Inhalation Q4H RT      Infusions:    sodium chloride       PRN Meds: cyclobenzaprine, 10 mg, TID PRN  hydrOXYzine HCl, 10 mg, TID PRN  sodium chloride flush, 5-40 mL, PRN  sodium chloride, , PRN  ondansetron, 4 mg, Q8H PRN   Or  ondansetron, 4 mg, Q6H PRN  polyethylene glycol, 17 g, Daily PRN  acetaminophen, 650 mg, Q6H PRN   Or  acetaminophen, 650 mg, Q6H PRN  aluminum & magnesium hydroxide-simethicone, 30 mL, Q6H PRN  albuterol, 2.5 mg, Q2H PRN  guaiFENesin-dextromethorphan, 5 mL, Q4H PRN        Labs and Imaging   XR CHEST PORTABLE    Result Date: 1/9/2025  EXAMINATION: ONE XRAY VIEW OF THE CHEST 1/9/2025 8:10 pm COMPARISON: 10/02/2024. HISTORY: ORDERING SYSTEM PROVIDED HISTORY: SOB TECHNOLOGIST PROVIDED HISTORY: Reason for exam:->SOB Reason for Exam: sob FINDINGS: Overlying items external to the patient somewhat limit evaluation. Mild bibasilar linear scarring versus atelectasis, left more than right.  No focal consolidations, pleural effusions or pulmonary edema.  No pneumothoraces.  Cardiac and mediastinal silhouettes are stable.  No acute osseous abnormality.     No acute abnormality.       CBC:   Recent Labs     01/09/25 1957

## 2025-01-11 NOTE — PROGRESS NOTES
Pt is on BiPAP, resp 20,spo2 99, and complaining of SOB. He states it might be the BiPAP settings or anxiety related. RT notified, and came to pt's bedside, breathing treatment given and worked on BiPAP settings, but pt states condition unchanged. He was given PRN Atarax at 8.32pm for anxiety.   MD Mcbride notified via [employer]. MD at pt's bedside at this time. No new orders received. Plan of care ongoing.

## 2025-01-11 NOTE — PLAN OF CARE
Problem: Discharge Planning  Goal: Discharge to home or other facility with appropriate resources  Outcome: Progressing     Problem: Pain  Goal: Verbalizes/displays adequate comfort level or baseline comfort level  Outcome: Progressing    Problem: Safety - Adult  Goal: Free from fall injury  Outcome: Progressing     Problem: Respiratory - Adult  Goal: Achieves optimal ventilation and oxygenation  Outcome: Progressing

## 2025-01-11 NOTE — PROGRESS NOTES
Pulmonary Critical Care Progress Note     Patient's name:  Serg Burnette  Medical Record Number: [id number]  Patient's account/billing number: [id number]  Patient's YOB: 1965  Age: 59 y.o.  Date of Admission: 1/9/2025  7:41 PM  Date of Consult: 1/11/2025      Primary Care Physician: Catherine Mims APRN - CNP      Code Status: Full Code    Chief complaint: Acute respiratory failure with hypercapnia    Assessment and Plan     Acute respiratory failure with hypercapnia  COPD with acute exacerbation  Obesity    Plan:  BiPAP intermittently during the day and continuous at night  Titrate oxygen to keep sats 88 to 92%  Systemic steroid  DuoNeb and Symbicort  Azithromycin for 3 days  High risk for decompensation monitor respiratory status closely          Overnight:  Still with shortness of breath currently on BiPAP    REVIEW OF SYSTEMS:  Review of Systems -   General ROS: negative  Psychological ROS: negative  Ophthalmic ROS: negative  ENT ROS: negative  Allergy and Immunology ROS: negative  Hematological and Lymphatic ROS: negative  Endocrine ROS: negative  Breast ROS: negative  Respiratory ROS: Shortness of breath  Cardiovascular ROS: Severe shortness of breath no chest pain  Gastrointestinal ROS:negative  Genito-Urinary ROS: negative  Musculoskeletal ROS: negative  Neurological ROS: negative  Dermatological ROS: negative        Physical Exam:    Vitals: BP (!) 167/72   Pulse 97   Temp 98 °F (36.7 °C)   Resp 19   Ht 1.651 m (5' 5\")   Wt 101.4 kg (223 lb 8.7 oz)   SpO2 97%   BMI 37.20 kg/m²     Last Body weight:   Wt Readings from Last 3 Encounters:   01/11/25 101.4 kg (223 lb 8.7 oz)   10/07/24 104.5 kg (230 lb 6.1 oz)       Body Mass Index : Body mass index is 37.2 kg/m².      Intake and Output summary:   Intake/Output Summary (Last 24 hours) at 1/11/2025 1047  Last data filed at 1/10/2025 1400  Gross per 24 hour   Intake 120 ml   Output --   Net 120 ml       Physical Examination:

## 2025-01-12 LAB
ALBUMIN SERPL-MCNC: 4 G/DL (ref 3.4–5)
ALBUMIN/GLOB SERPL: 1.5 {RATIO} (ref 1.1–2.2)
ALP SERPL-CCNC: 76 U/L (ref 40–129)
ALT SERPL-CCNC: 20 U/L (ref 10–40)
ANION GAP SERPL CALCULATED.3IONS-SCNC: 9 MMOL/L (ref 3–16)
AST SERPL-CCNC: 54 U/L (ref 15–37)
BASE EXCESS BLDV CALC-SCNC: 8.4 MMOL/L
BILIRUB SERPL-MCNC: <0.2 MG/DL (ref 0–1)
BUN SERPL-MCNC: 24 MG/DL (ref 7–20)
CALCIUM SERPL-MCNC: 8.7 MG/DL (ref 8.3–10.6)
CHLORIDE SERPL-SCNC: 98 MMOL/L (ref 99–110)
CO2 BLDV-SCNC: 40 MMOL/L
CO2 SERPL-SCNC: 31 MMOL/L (ref 21–32)
COHGB MFR BLDV: 1.2 %
CREAT SERPL-MCNC: 0.9 MG/DL (ref 0.9–1.3)
DEPRECATED RDW RBC AUTO: 16.5 % (ref 12.4–15.4)
FLUAV + FLUBV AG NOSE IA.RAPID: DETECTED
FLUAV + FLUBV AG NOSE IA.RAPID: NOT DETECTED
GFR SERPLBLD CREATININE-BSD FMLA CKD-EPI: >90 ML/MIN/{1.73_M2}
GLUCOSE SERPL-MCNC: 104 MG/DL (ref 70–99)
HCO3 BLDV-SCNC: 37 MMOL/L (ref 23–29)
HCT VFR BLD AUTO: 40.2 % (ref 40.5–52.5)
HGB BLD-MCNC: 12.8 G/DL (ref 13.5–17.5)
MCH RBC QN AUTO: 26.9 PG (ref 26–34)
MCHC RBC AUTO-ENTMCNC: 31.7 G/DL (ref 31–36)
MCV RBC AUTO: 84.8 FL (ref 80–100)
METHGB MFR BLDV: 0.5 %
O2 THERAPY: ABNORMAL
PCO2 BLDV: 74.7 MMHG (ref 40–50)
PH BLDV: 7.31 [PH] (ref 7.35–7.45)
PLATELET # BLD AUTO: 333 K/UL (ref 135–450)
PMV BLD AUTO: 9.2 FL (ref 5–10.5)
PO2 BLDV: 47 MMHG
POTASSIUM SERPL-SCNC: 4.9 MMOL/L (ref 3.5–5.1)
PROT SERPL-MCNC: 6.7 G/DL (ref 6.4–8.2)
RBC # BLD AUTO: 4.74 M/UL (ref 4.2–5.9)
SAO2 % BLDV: 79 %
SODIUM SERPL-SCNC: 138 MMOL/L (ref 136–145)
WBC # BLD AUTO: 13.7 K/UL (ref 4–11)

## 2025-01-12 PROCEDURE — 6370000000 HC RX 637 (ALT 250 FOR IP): Performed by: FAMILY MEDICINE

## 2025-01-12 PROCEDURE — 2500000003 HC RX 250 WO HCPCS: Performed by: INTERNAL MEDICINE

## 2025-01-12 PROCEDURE — 6370000000 HC RX 637 (ALT 250 FOR IP): Performed by: INTERNAL MEDICINE

## 2025-01-12 PROCEDURE — 87502 INFLUENZA DNA AMP PROBE: CPT

## 2025-01-12 PROCEDURE — 2060000000 HC ICU INTERMEDIATE R&B

## 2025-01-12 PROCEDURE — 36415 COLL VENOUS BLD VENIPUNCTURE: CPT

## 2025-01-12 PROCEDURE — 80053 COMPREHEN METABOLIC PANEL: CPT

## 2025-01-12 PROCEDURE — 94660 CPAP INITIATION&MGMT: CPT

## 2025-01-12 PROCEDURE — 2700000000 HC OXYGEN THERAPY PER DAY

## 2025-01-12 PROCEDURE — 82803 BLOOD GASES ANY COMBINATION: CPT

## 2025-01-12 PROCEDURE — 2500000003 HC RX 250 WO HCPCS

## 2025-01-12 PROCEDURE — 6360000002 HC RX W HCPCS: Performed by: FAMILY MEDICINE

## 2025-01-12 PROCEDURE — 2580000003 HC RX 258: Performed by: INTERNAL MEDICINE

## 2025-01-12 PROCEDURE — 85027 COMPLETE CBC AUTOMATED: CPT

## 2025-01-12 PROCEDURE — 6360000002 HC RX W HCPCS: Performed by: INTERNAL MEDICINE

## 2025-01-12 PROCEDURE — 6370000000 HC RX 637 (ALT 250 FOR IP): Performed by: NURSE PRACTITIONER

## 2025-01-12 PROCEDURE — 99233 SBSQ HOSP IP/OBS HIGH 50: CPT | Performed by: INTERNAL MEDICINE

## 2025-01-12 PROCEDURE — 2580000003 HC RX 258: Performed by: FAMILY MEDICINE

## 2025-01-12 PROCEDURE — 94761 N-INVAS EAR/PLS OXIMETRY MLT: CPT

## 2025-01-12 PROCEDURE — 94640 AIRWAY INHALATION TREATMENT: CPT

## 2025-01-12 RX ORDER — WATER 10 ML/10ML
INJECTION INTRAMUSCULAR; INTRAVENOUS; SUBCUTANEOUS
Status: COMPLETED
Start: 2025-01-12 | End: 2025-01-12

## 2025-01-12 RX ORDER — METHYLPREDNISOLONE SODIUM SUCCINATE 40 MG/ML
40 INJECTION INTRAMUSCULAR; INTRAVENOUS EVERY 12 HOURS
Status: DISCONTINUED | OUTPATIENT
Start: 2025-01-12 | End: 2025-01-15

## 2025-01-12 RX ORDER — OSELTAMIVIR PHOSPHATE 75 MG/1
75 CAPSULE ORAL 2 TIMES DAILY
Status: DISCONTINUED | OUTPATIENT
Start: 2025-01-12 | End: 2025-01-13

## 2025-01-12 RX ADMIN — SODIUM CHLORIDE, PRESERVATIVE FREE 10 ML: 5 INJECTION INTRAVENOUS at 10:06

## 2025-01-12 RX ADMIN — OSELTAMIVIR PHOSPHATE 75 MG: 75 CAPSULE ORAL at 21:29

## 2025-01-12 RX ADMIN — LOSARTAN POTASSIUM 25 MG: 25 TABLET, FILM COATED ORAL at 09:07

## 2025-01-12 RX ADMIN — AZITHROMYCIN MONOHYDRATE 500 MG: 500 INJECTION, POWDER, LYOPHILIZED, FOR SOLUTION INTRAVENOUS at 10:08

## 2025-01-12 RX ADMIN — LEVOTHYROXINE SODIUM 175 MCG: 0.12 TABLET ORAL at 09:07

## 2025-01-12 RX ADMIN — SODIUM CHLORIDE: 9 INJECTION, SOLUTION INTRAVENOUS at 10:08

## 2025-01-12 RX ADMIN — IPRATROPIUM BROMIDE AND ALBUTEROL SULFATE 1 DOSE: 2.5; .5 SOLUTION RESPIRATORY (INHALATION) at 07:50

## 2025-01-12 RX ADMIN — HYDROXYZINE HYDROCHLORIDE 10 MG: 10 TABLET ORAL at 06:19

## 2025-01-12 RX ADMIN — WATER 10 ML: 1 INJECTION INTRAMUSCULAR; INTRAVENOUS; SUBCUTANEOUS at 12:12

## 2025-01-12 RX ADMIN — METHYLPREDNISOLONE SODIUM SUCCINATE 40 MG: 40 INJECTION INTRAMUSCULAR; INTRAVENOUS at 12:12

## 2025-01-12 RX ADMIN — ENOXAPARIN SODIUM 30 MG: 100 INJECTION SUBCUTANEOUS at 21:29

## 2025-01-12 RX ADMIN — SODIUM CHLORIDE, PRESERVATIVE FREE 10 ML: 5 INJECTION INTRAVENOUS at 21:30

## 2025-01-12 RX ADMIN — ASPIRIN 81 MG: 81 TABLET, COATED ORAL at 09:07

## 2025-01-12 RX ADMIN — CETIRIZINE HYDROCHLORIDE 10 MG: 10 TABLET, FILM COATED ORAL at 09:07

## 2025-01-12 RX ADMIN — IPRATROPIUM BROMIDE AND ALBUTEROL SULFATE 1 DOSE: 2.5; .5 SOLUTION RESPIRATORY (INHALATION) at 20:35

## 2025-01-12 RX ADMIN — IPRATROPIUM BROMIDE AND ALBUTEROL SULFATE 1 DOSE: 2.5; .5 SOLUTION RESPIRATORY (INHALATION) at 11:44

## 2025-01-12 RX ADMIN — IPRATROPIUM BROMIDE AND ALBUTEROL SULFATE 1 DOSE: 2.5; .5 SOLUTION RESPIRATORY (INHALATION) at 03:27

## 2025-01-12 RX ADMIN — Medication 2 PUFF: at 09:20

## 2025-01-12 RX ADMIN — HYDROXYZINE HYDROCHLORIDE 10 MG: 10 TABLET ORAL at 21:29

## 2025-01-12 RX ADMIN — PANTOPRAZOLE SODIUM 40 MG: 40 TABLET, DELAYED RELEASE ORAL at 06:15

## 2025-01-12 RX ADMIN — Medication 2 PUFF: at 20:35

## 2025-01-12 RX ADMIN — AMLODIPINE BESYLATE 10 MG: 10 TABLET ORAL at 09:07

## 2025-01-12 RX ADMIN — ESCITALOPRAM OXALATE 10 MG: 10 TABLET ORAL at 09:07

## 2025-01-12 RX ADMIN — ENOXAPARIN SODIUM 30 MG: 100 INJECTION SUBCUTANEOUS at 09:07

## 2025-01-12 NOTE — PROGRESS NOTES
Pulmonary Critical Care Progress Note     Patient's name:  Serg Burnette  Medical Record Number: [id number]  Patient's account/billing number: [id number]  Patient's YOB: 1965  Age: 59 y.o.  Date of Admission: 1/9/2025  7:41 PM  Date of Consult: 1/12/2025      Primary Care Physician: Catherine Mims APRN - CNP      Code Status: Full Code    Chief complaint: Acute respiratory failure with hypercapnia    Assessment and Plan     Acute respiratory failure with hypercapnia  COPD with acute exacerbation  Obesity    Plan:  BiPAP intermittently during the day and continuous at night, he would benefit from long term bipap, will arrange before discharge.  Titrate oxygen to keep sats 88 to 92%  Systemic steroid, continue solumedrol   DuoNeb and Symbicort  Azithromycin for 3 days  High risk for decompensation monitor respiratory status closely          Overnight:  Still with shortness of breath currently on O2  Wore bipap all night    REVIEW OF SYSTEMS:  Review of Systems -   General ROS: negative  Psychological ROS: negative  Ophthalmic ROS: negative  ENT ROS: negative  Allergy and Immunology ROS: negative  Hematological and Lymphatic ROS: negative  Endocrine ROS: negative  Breast ROS: negative  Respiratory ROS: Shortness of breath  Cardiovascular ROS: Severe shortness of breath no chest pain  Gastrointestinal ROS:negative  Genito-Urinary ROS: negative  Musculoskeletal ROS: negative  Neurological ROS: negative  Dermatological ROS: negative        Physical Exam:    Vitals: BP (!) 135/101   Pulse (!) 103   Temp 98 °F (36.7 °C) (Axillary)   Resp 24   Ht 1.651 m (5' 5\")   Wt 101.4 kg (223 lb 8.7 oz)   SpO2 100%   BMI 37.20 kg/m²     Last Body weight:   Wt Readings from Last 3 Encounters:   01/11/25 101.4 kg (223 lb 8.7 oz)   10/07/24 104.5 kg (230 lb 6.1 oz)       Body Mass Index : Body mass index is 37.2 kg/m².      Intake and Output summary:   Intake/Output Summary (Last 24 hours) at 1/12/2025

## 2025-01-12 NOTE — PROGRESS NOTES
V2.0    [facility] [place]      Name:  Serg Burnette /Age/Sex: 1965  (59 y.o. male)   MRN & CSN:  [id number] & [id number] Encounter Date/Time: 2025 11:23 AM EST   Location:  [id number] PCP: Catherine Mims APRN - CNP     Attending:Nina Crain MD       Hospital Day: 4    Assessment and Recommendations   Serg Burnette is a 59 y.o. male with pmh of COPD, HLD, HTN who presents with COPD exacerbation (HCC)    Patient was examined this morning.  He was on 2 L nasal cannula saturating at 100%.  Patient complains of being very short of breath.  He feels better when he is sitting on the side of the bed  Wife called this morning stating that she tested positive for influenza and would like him to be also checked since she spent the last 2 nights in his room.  Patient was tested negative for flu AMB on 2025.  Will obtain a new test today    Pulmonology on board recommending BiPAP intermediately throughout the day and continuous at night, continue with systemic steroids, DuoNeb and Symbicort, and azithromycin     Remains very high risk for intubation  Labs this morning sodium 138, potassium 4.9, creatinine 0.9 white blood cell count 13.7 patient is on prednisone    Plan:   COPD exacerbation  Azithromycin 500 mg IV daily  Budesonide breathing treatments  Systemic steroids  Continuous BiPAP  Pulmonology consult    2.  Hypertension  Patient is currently on amlodipine 10 mg, losartan 25 mg  Will follow vitals for further management if needed    3.  Hypothyroidism  Synthroid 175 mcg daily    4. influenza A  Pt tested neg on 2025  Tested fIu A+ 2025  TamifIu 75mg bid x 5 days    Will continue to follow, monitor and manage chronic medical conditions with medication listed below      Diet ADULT DIET; Regular; 5 carb choices (75 gm/meal); Low Fat/Low Chol/High Fiber/MADELYN   DVT Prophylaxis [x] Lovenox, []  Heparin, [] SCDs, [] Ambulation,  [] Eliquis, [] Xarelto  [] Coumadin   Code Status      BMP:    Recent Labs     01/09/25  1957 01/10/25  0454 01/12/25  0539    138 138   K 4.1 4.7 4.9    100 98*   CO2 27 27 31   BUN 17 16 24*   CREATININE 1.0 1.0 0.9   GLUCOSE 104* 153* 104*     Hepatic:   Recent Labs     01/09/25 1957 01/12/25  0539   AST 19 54*   ALT 14 20   BILITOT 0.5 <0.2   ALKPHOS 91 76     Lipids: No results found for: \"CHOL\", \"HDL\", \"TRIG\"  Hemoglobin A1C: No results found for: \"LABA1C\"  TSH: No results found for: \"TSH\"  Troponin: No results found for: \"TROPONINT\"  Lactic Acid:   Recent Labs     01/09/25 1957   LACTA 1.3     BNP:   Recent Labs     01/09/25 1957   PROBNP <36     UA:No results found for: \"NITRU\", \"COLORU\", \"PHUR\", \"LABCAST\", \"WBCUA\", \"RBCUA\", \"MUCUS\", \"TRICHOMONAS\", \"YEAST\", \"BACTERIA\", \"CLARITYU\", \"SPECGRAV\", \"LEUKOCYTESUR\", \"UROBILINOGEN\", \"BILIRUBINUR\", \"BLOODU\", \"GLUCOSEU\", \"KETUA\", \"AMORPHOUS\"  Urine Cultures: No results found for: \"LABURIN\"  Blood Cultures: No results found for: \"BC\"  No results found for: \"BLOODCULT2\"  Organism:   Lab Results   Component Value Date/Time    ORG Moraxella catarrhalis BY DNA 10/04/2024 11:10 AM    ORG Respiratory syncytial virus by PCR 10/04/2024 11:10 AM         Electronically signed by Nina Crain MD on 1/12/2025 at 10:58 AM  Comment: Please note this report has been produced using speech recognition software and may contain errors related to that system including errors in grammar, punctuation, and spelling, as well as words and phrases that may be inappropriate. If there are any questions or concerns, please feel free to contact the dictating provider for clarification.

## 2025-01-12 NOTE — PROGRESS NOTES
Respiratory Therapy    Removed BIPAP for breakfast this AM  Pt requesting Tudorza 400mg. Pt was offered Spiriva as an alternative and he declined. Message sent to [facility] that pt wants to take his Home MDI Tudorza BID. Duoneb and symbicort given. Awaiting 0800 VBG results     Pts wife called and stated that she tested positve for Flu yesterday and spent multiple nights a the hospital with him.     Pt off BIPAP currently. Tolerating fair. SPO2 99% on 2L. Pt admits to increased anxiety the past day. This does also occur sometimes at home.   Message sent to MD asking to change from PO to IV Prednisone. RN aware.     Electronically signed by Luz Jaeger RCP on 1/12/2025 at 9:55 AM

## 2025-01-13 LAB
ALBUMIN SERPL-MCNC: 3.9 G/DL (ref 3.4–5)
ALBUMIN/GLOB SERPL: 1.5 {RATIO} (ref 1.1–2.2)
ALP SERPL-CCNC: 69 U/L (ref 40–129)
ALT SERPL-CCNC: 21 U/L (ref 10–40)
ANION GAP SERPL CALCULATED.3IONS-SCNC: 10 MMOL/L (ref 3–16)
AST SERPL-CCNC: 43 U/L (ref 15–37)
BILIRUB SERPL-MCNC: 0.3 MG/DL (ref 0–1)
BUN SERPL-MCNC: 21 MG/DL (ref 7–20)
CALCIUM SERPL-MCNC: 8.8 MG/DL (ref 8.3–10.6)
CHLORIDE SERPL-SCNC: 98 MMOL/L (ref 99–110)
CO2 SERPL-SCNC: 32 MMOL/L (ref 21–32)
CREAT SERPL-MCNC: 0.9 MG/DL (ref 0.9–1.3)
DEPRECATED RDW RBC AUTO: 16 % (ref 12.4–15.4)
GFR SERPLBLD CREATININE-BSD FMLA CKD-EPI: >90 ML/MIN/{1.73_M2}
GLUCOSE SERPL-MCNC: 105 MG/DL (ref 70–99)
HCT VFR BLD AUTO: 39.4 % (ref 40.5–52.5)
HGB BLD-MCNC: 12.8 G/DL (ref 13.5–17.5)
MCH RBC QN AUTO: 27.1 PG (ref 26–34)
MCHC RBC AUTO-ENTMCNC: 32.4 G/DL (ref 31–36)
MCV RBC AUTO: 83.6 FL (ref 80–100)
PLATELET # BLD AUTO: 331 K/UL (ref 135–450)
PMV BLD AUTO: 8.9 FL (ref 5–10.5)
POTASSIUM SERPL-SCNC: 4.8 MMOL/L (ref 3.5–5.1)
PROT SERPL-MCNC: 6.5 G/DL (ref 6.4–8.2)
RBC # BLD AUTO: 4.72 M/UL (ref 4.2–5.9)
SODIUM SERPL-SCNC: 140 MMOL/L (ref 136–145)
WBC # BLD AUTO: 10.2 K/UL (ref 4–11)

## 2025-01-13 PROCEDURE — 85027 COMPLETE CBC AUTOMATED: CPT

## 2025-01-13 PROCEDURE — 6370000000 HC RX 637 (ALT 250 FOR IP): Performed by: FAMILY MEDICINE

## 2025-01-13 PROCEDURE — 94640 AIRWAY INHALATION TREATMENT: CPT

## 2025-01-13 PROCEDURE — 2500000003 HC RX 250 WO HCPCS: Performed by: INTERNAL MEDICINE

## 2025-01-13 PROCEDURE — 99233 SBSQ HOSP IP/OBS HIGH 50: CPT | Performed by: INTERNAL MEDICINE

## 2025-01-13 PROCEDURE — 6360000002 HC RX W HCPCS: Performed by: INTERNAL MEDICINE

## 2025-01-13 PROCEDURE — 94761 N-INVAS EAR/PLS OXIMETRY MLT: CPT

## 2025-01-13 PROCEDURE — 6370000000 HC RX 637 (ALT 250 FOR IP): Performed by: NURSE PRACTITIONER

## 2025-01-13 PROCEDURE — 2700000000 HC OXYGEN THERAPY PER DAY

## 2025-01-13 PROCEDURE — 2500000003 HC RX 250 WO HCPCS

## 2025-01-13 PROCEDURE — 94660 CPAP INITIATION&MGMT: CPT

## 2025-01-13 PROCEDURE — 2060000000 HC ICU INTERMEDIATE R&B

## 2025-01-13 PROCEDURE — 36415 COLL VENOUS BLD VENIPUNCTURE: CPT

## 2025-01-13 PROCEDURE — 80053 COMPREHEN METABOLIC PANEL: CPT

## 2025-01-13 RX ORDER — WATER 10 ML/10ML
INJECTION INTRAMUSCULAR; INTRAVENOUS; SUBCUTANEOUS
Status: COMPLETED
Start: 2025-01-13 | End: 2025-01-13

## 2025-01-13 RX ORDER — AZITHROMYCIN 500 MG/1
500 TABLET, FILM COATED ORAL DAILY
Status: COMPLETED | OUTPATIENT
Start: 2025-01-13 | End: 2025-01-13

## 2025-01-13 RX ADMIN — METHYLPREDNISOLONE SODIUM SUCCINATE 40 MG: 40 INJECTION INTRAMUSCULAR; INTRAVENOUS at 00:20

## 2025-01-13 RX ADMIN — CYCLOBENZAPRINE 10 MG: 10 TABLET, FILM COATED ORAL at 20:43

## 2025-01-13 RX ADMIN — LOSARTAN POTASSIUM 25 MG: 25 TABLET, FILM COATED ORAL at 09:50

## 2025-01-13 RX ADMIN — ESCITALOPRAM OXALATE 10 MG: 10 TABLET ORAL at 09:52

## 2025-01-13 RX ADMIN — SODIUM CHLORIDE, PRESERVATIVE FREE 10 ML: 5 INJECTION INTRAVENOUS at 09:53

## 2025-01-13 RX ADMIN — AMLODIPINE BESYLATE 10 MG: 10 TABLET ORAL at 09:51

## 2025-01-13 RX ADMIN — ENOXAPARIN SODIUM 30 MG: 100 INJECTION SUBCUTANEOUS at 20:38

## 2025-01-13 RX ADMIN — IPRATROPIUM BROMIDE AND ALBUTEROL SULFATE 1 DOSE: 2.5; .5 SOLUTION RESPIRATORY (INHALATION) at 15:55

## 2025-01-13 RX ADMIN — HYDROXYZINE HYDROCHLORIDE 10 MG: 10 TABLET ORAL at 09:50

## 2025-01-13 RX ADMIN — CETIRIZINE HYDROCHLORIDE 10 MG: 10 TABLET, FILM COATED ORAL at 09:51

## 2025-01-13 RX ADMIN — IPRATROPIUM BROMIDE AND ALBUTEROL SULFATE 1 DOSE: 2.5; .5 SOLUTION RESPIRATORY (INHALATION) at 00:05

## 2025-01-13 RX ADMIN — PANTOPRAZOLE SODIUM 40 MG: 40 TABLET, DELAYED RELEASE ORAL at 09:51

## 2025-01-13 RX ADMIN — IPRATROPIUM BROMIDE AND ALBUTEROL SULFATE 1 DOSE: 2.5; .5 SOLUTION RESPIRATORY (INHALATION) at 03:46

## 2025-01-13 RX ADMIN — OSELTAMIVIR PHOSPHATE 75 MG: 75 CAPSULE ORAL at 09:51

## 2025-01-13 RX ADMIN — ASPIRIN 81 MG: 81 TABLET, COATED ORAL at 09:51

## 2025-01-13 RX ADMIN — METHYLPREDNISOLONE SODIUM SUCCINATE 40 MG: 40 INJECTION INTRAMUSCULAR; INTRAVENOUS at 23:31

## 2025-01-13 RX ADMIN — LEVOTHYROXINE SODIUM 175 MCG: 0.12 TABLET ORAL at 09:51

## 2025-01-13 RX ADMIN — IPRATROPIUM BROMIDE AND ALBUTEROL SULFATE 1 DOSE: 2.5; .5 SOLUTION RESPIRATORY (INHALATION) at 19:38

## 2025-01-13 RX ADMIN — SODIUM CHLORIDE, PRESERVATIVE FREE 10 ML: 5 INJECTION INTRAVENOUS at 20:38

## 2025-01-13 RX ADMIN — METHYLPREDNISOLONE SODIUM SUCCINATE 40 MG: 40 INJECTION INTRAMUSCULAR; INTRAVENOUS at 13:04

## 2025-01-13 RX ADMIN — IPRATROPIUM BROMIDE AND ALBUTEROL SULFATE 1 DOSE: 2.5; .5 SOLUTION RESPIRATORY (INHALATION) at 08:01

## 2025-01-13 RX ADMIN — ENOXAPARIN SODIUM 30 MG: 100 INJECTION SUBCUTANEOUS at 09:52

## 2025-01-13 RX ADMIN — WATER 1 ML: 1 INJECTION INTRAMUSCULAR; INTRAVENOUS; SUBCUTANEOUS at 23:31

## 2025-01-13 RX ADMIN — AZITHROMYCIN 500 MG: 500 TABLET, FILM COATED ORAL at 09:50

## 2025-01-13 RX ADMIN — IPRATROPIUM BROMIDE AND ALBUTEROL SULFATE 1 DOSE: 2.5; .5 SOLUTION RESPIRATORY (INHALATION) at 12:16

## 2025-01-13 NOTE — ACP (ADVANCE CARE PLANNING)
Advance Care Planning   Healthcare Decision Maker:    Primary Decision Maker: Suyapa Thompson - Spouse - [phone]    Today we documented Decision Maker(s) consistent with Legal Next of Kin hierarchy.       Electronically signed by BOB Page on 1/13/2025 at 12:14 PM       12:28

## 2025-01-13 NOTE — PROGRESS NOTES
Pulmonary Critical Care Progress Note     Patient's name:  Serg Burnette  Medical Record Number: [id number]  Patient's account/billing number: [id number]  Patient's YOB: 1965  Age: 59 y.o.  Date of Admission: 1/9/2025  7:41 PM  Date of Consult: 1/13/2025      Primary Care Physician: Catherine Mims APRN - CNP      Code Status: Full Code    Chief complaint: Acute respiratory failure with hypercapnia    Assessment and Plan     Acute respiratory failure with hypercapnia  COPD with acute exacerbation secondary to influenza  Obesity    Plan:  BiPAP intermittently during the day and continuous at night, he would benefit from long term bipap, will arrange before discharge.  We will obtain nocturnal pulse ox study on his home oxygen tomorrow  Titrate oxygen to keep sats 88 to 92%  Systemic steroid, continue solumedrol   DuoNeb and Symbicort  Azithromycin for 3 days          Overnight:  Had an episode of shortness of breath overnight    REVIEW OF SYSTEMS:  Review of Systems -   General ROS: negative  Psychological ROS: negative  Ophthalmic ROS: negative  ENT ROS: negative  Allergy and Immunology ROS: negative  Hematological and Lymphatic ROS: negative  Endocrine ROS: negative  Breast ROS: negative  Respiratory ROS: Shortness of breath  Cardiovascular ROS: Severe shortness of breath no chest pain  Gastrointestinal ROS:negative  Genito-Urinary ROS: negative  Musculoskeletal ROS: negative  Neurological ROS: negative  Dermatological ROS: negative        Physical Exam:    Vitals: /81   Pulse 92   Temp 98.1 °F (36.7 °C) (Axillary)   Resp 15   Ht 1.651 m (5' 5\")   Wt 105.7 kg (233 lb 0.4 oz)   SpO2 96%   BMI 38.78 kg/m²     Last Body weight:   Wt Readings from Last 3 Encounters:   01/13/25 105.7 kg (233 lb 0.4 oz)   10/07/24 104.5 kg (230 lb 6.1 oz)       Body Mass Index : Body mass index is 38.78 kg/m².      Intake and Output summary:   Intake/Output Summary (Last 24 hours) at 1/13/2025

## 2025-01-13 NOTE — CARE COORDINATION
Case Management Assessment  Initial Evaluation    Date/Time of Evaluation: 1/13/2025 12:18 PM  Assessment Completed by: BOB Page    If patient is discharged prior to next notation, then this note serves as note for discharge by case management.    Patient Name: Serg Burnette                   YOB: 1965  Diagnosis: COPD exacerbation (HCC) [J44.1]  Acute respiratory failure with hypercapnia [J96.02]                   Date / Time: 1/9/2025  7:41 PM    Patient Admission Status: Inpatient   Readmission Risk (Low < 19, Mod (19-27), High > 27): Readmission Risk Score: 11.4    Current PCP: Catherine Mims APRN - CNP  PCP verified by CM? (P) Yes    Chart Reviewed: Yes      History Provided by: (P) Patient  Patient Orientation: (P) Alert and Oriented    Patient Cognition: (P) Alert    Hospitalization in the last 30 days (Readmission):  No    If yes, Readmission Assessment in CM Navigator will be completed.    Advance Directives:      Code Status: Full Code   Patient's Primary Decision Maker is: (P) Legal Next of Kin    Primary Decision Maker: Suyapa Thompson - Spouse - [phone]    Discharge Planning:    Patient lives with: (P) Spouse/Significant Other Type of Home: (P) Apartment  Primary Care Giver: (P) Self  Patient Support Systems include: (P) Spouse/Significant Other, Children   Current Financial resources: (P) Medicare, Medicaid  Current community resources: (P) None  Current services prior to admission: (P) None            Current DME:              Type of Home Care services:  (P) Aide Services    ADLS  Prior functional level: (P) Independent in ADLs/IADLs  Current functional level: (P) Assistance with the following:, Bathing, Cooking, Housework, Dressing, Toileting, Mobility, Shopping    PT AM-PAC:   /24  OT AM-PAC:   /24    Family can provide assistance at DC: (P) Yes  Would you like Case Management to discuss the discharge plan with any other family members/significant others, and if

## 2025-01-13 NOTE — PROGRESS NOTES
V2.0    [facility] [place] Progress Note      Name:  Serg Burnette /Age/Sex: 1965  (59 y.o. male)   MRN & CSN:  [id number] & [id number] Encounter Date/Time: 2025 11:23 AM EST   Location:  [id number] PCP: Catherine Mims APRN - CNP     Attending:Nina Crain MD       Hospital Day: 5    Assessment and Recommendations   Serg Burnette is a 59 y.o. male with pmh of COPD, HLD, HTN who presents with COPD exacerbation (HCC)    Patient was examined this morning.  He was on 2 L nasal cannula.  Patient is on and off the BiPAP  Patient is tested influenza A positive yesterday and he was started on Tamiflu.  As soon as patient took the Tamiflu he felt heart racing and palpitation.  He requested no more doses of the Tamiflu.  Will continue supportive care for the influenza virus      Pulmonology on board recommending BiPAP intermediately throughout the day and continuous at night, continue with systemic steroids, DuoNeb and Symbicort, and he completed a 3-day course of azithromycin IV    Remains very high risk for intubation  Labs this morning sodium 140, potassium 4.8, creatinine 0.9, glucose 105, white blood cell count 10.2, H&H stable at 12.8, 39.4      Plan:   COPD exacerbation  Azithromycin 500 mg IV daily  Budesonide breathing treatments  Systemic steroids  Continuous BiPAP  Pulmonology consult    2.  Hypertension  Patient is currently on amlodipine 10 mg, losartan 25 mg  Will follow vitals for further management if needed    3.  Hypothyroidism  Synthroid 175 mcg daily    4. influenza A  Pt tested neg on 2025  Tested fIu A+ 2025  TamifIu 75mg bid x 5 days-will discontinue  Supportive treatment    Will continue to follow, monitor and manage chronic medical conditions with medication listed below      Diet ADULT DIET; Regular; 5 carb choices (75 gm/meal); Low Fat/Low Chol/High Fiber/MADELYN   DVT Prophylaxis [x] Lovenox, []  Heparin, [] SCDs, [] Ambulation,  [] Eliquis, [] Xarelto  [] Coumadin   Code

## 2025-01-13 NOTE — PLAN OF CARE
Problem: Discharge Planning  Goal: Discharge to home or other facility with appropriate resources  1/13/2025 0110 by Linda Almendarez RN  Outcome: Progressing     Problem: Discharge Planning  Goal: Discharge to home or other facility with appropriate resources  1/13/2025 1009 by Odette Sandoval RN  Outcome: Progressing     Problem: Pain  Goal: Verbalizes/displays adequate comfort level or baseline comfort level  1/13/2025 1009 by Odette Sandoval RN  Outcome: Progressing     Problem: Safety - Adult  Goal: Free from fall injury  1/13/2025 1009 by Odette Sandoval RN  Outcome: Progressing     Problem: Respiratory - Adult  Goal: Achieves optimal ventilation and oxygenation  1/13/2025 1009 by Odette Sandoval RN  Outcome: Progressing     Problem: Hematologic - Adult  Goal: Maintains hematologic stability  Outcome: Progressing

## 2025-01-14 LAB
ALBUMIN SERPL-MCNC: 4 G/DL (ref 3.4–5)
ALBUMIN/GLOB SERPL: 1.5 {RATIO} (ref 1.1–2.2)
ALP SERPL-CCNC: 68 U/L (ref 40–129)
ALT SERPL-CCNC: 18 U/L (ref 10–40)
ANION GAP SERPL CALCULATED.3IONS-SCNC: 7 MMOL/L (ref 3–16)
AST SERPL-CCNC: 30 U/L (ref 15–37)
BILIRUB SERPL-MCNC: 0.4 MG/DL (ref 0–1)
BUN SERPL-MCNC: 22 MG/DL (ref 7–20)
CALCIUM SERPL-MCNC: 9.1 MG/DL (ref 8.3–10.6)
CHLORIDE SERPL-SCNC: 96 MMOL/L (ref 99–110)
CO2 SERPL-SCNC: 34 MMOL/L (ref 21–32)
CREAT SERPL-MCNC: 0.8 MG/DL (ref 0.9–1.3)
DEPRECATED RDW RBC AUTO: 16.1 % (ref 12.4–15.4)
GFR SERPLBLD CREATININE-BSD FMLA CKD-EPI: >90 ML/MIN/{1.73_M2}
GLUCOSE SERPL-MCNC: 109 MG/DL (ref 70–99)
HCT VFR BLD AUTO: 40.5 % (ref 40.5–52.5)
HGB BLD-MCNC: 13.1 G/DL (ref 13.5–17.5)
MCH RBC QN AUTO: 27.2 PG (ref 26–34)
MCHC RBC AUTO-ENTMCNC: 32.3 G/DL (ref 31–36)
MCV RBC AUTO: 84.2 FL (ref 80–100)
PLATELET # BLD AUTO: 323 K/UL (ref 135–450)
PMV BLD AUTO: 9.1 FL (ref 5–10.5)
POTASSIUM SERPL-SCNC: 5 MMOL/L (ref 3.5–5.1)
PROT SERPL-MCNC: 6.7 G/DL (ref 6.4–8.2)
RBC # BLD AUTO: 4.81 M/UL (ref 4.2–5.9)
SODIUM SERPL-SCNC: 137 MMOL/L (ref 136–145)
WBC # BLD AUTO: 9.1 K/UL (ref 4–11)

## 2025-01-14 PROCEDURE — 94660 CPAP INITIATION&MGMT: CPT

## 2025-01-14 PROCEDURE — 2700000000 HC OXYGEN THERAPY PER DAY

## 2025-01-14 PROCEDURE — 2500000003 HC RX 250 WO HCPCS

## 2025-01-14 PROCEDURE — 94640 AIRWAY INHALATION TREATMENT: CPT

## 2025-01-14 PROCEDURE — 36415 COLL VENOUS BLD VENIPUNCTURE: CPT

## 2025-01-14 PROCEDURE — 80053 COMPREHEN METABOLIC PANEL: CPT

## 2025-01-14 PROCEDURE — 6370000000 HC RX 637 (ALT 250 FOR IP): Performed by: FAMILY MEDICINE

## 2025-01-14 PROCEDURE — 6370000000 HC RX 637 (ALT 250 FOR IP): Performed by: NURSE PRACTITIONER

## 2025-01-14 PROCEDURE — 94761 N-INVAS EAR/PLS OXIMETRY MLT: CPT

## 2025-01-14 PROCEDURE — 2060000000 HC ICU INTERMEDIATE R&B

## 2025-01-14 PROCEDURE — 85027 COMPLETE CBC AUTOMATED: CPT

## 2025-01-14 PROCEDURE — 6360000002 HC RX W HCPCS: Performed by: INTERNAL MEDICINE

## 2025-01-14 PROCEDURE — 2500000003 HC RX 250 WO HCPCS: Performed by: INTERNAL MEDICINE

## 2025-01-14 PROCEDURE — 6370000000 HC RX 637 (ALT 250 FOR IP): Performed by: INTERNAL MEDICINE

## 2025-01-14 PROCEDURE — 99233 SBSQ HOSP IP/OBS HIGH 50: CPT | Performed by: INTERNAL MEDICINE

## 2025-01-14 RX ORDER — ALBUTEROL SULFATE 0.83 MG/ML
2.5 SOLUTION RESPIRATORY (INHALATION) EVERY 4 HOURS PRN
Status: DISCONTINUED | OUTPATIENT
Start: 2025-01-14 | End: 2025-01-17 | Stop reason: HOSPADM

## 2025-01-14 RX ORDER — WATER 10 ML/10ML
INJECTION INTRAMUSCULAR; INTRAVENOUS; SUBCUTANEOUS
Status: COMPLETED
Start: 2025-01-14 | End: 2025-01-14

## 2025-01-14 RX ORDER — IPRATROPIUM BROMIDE AND ALBUTEROL SULFATE 2.5; .5 MG/3ML; MG/3ML
1 SOLUTION RESPIRATORY (INHALATION)
Status: DISCONTINUED | OUTPATIENT
Start: 2025-01-15 | End: 2025-01-17 | Stop reason: HOSPADM

## 2025-01-14 RX ADMIN — CETIRIZINE HYDROCHLORIDE 10 MG: 10 TABLET, FILM COATED ORAL at 09:40

## 2025-01-14 RX ADMIN — AMLODIPINE BESYLATE 10 MG: 10 TABLET ORAL at 09:40

## 2025-01-14 RX ADMIN — SODIUM CHLORIDE, PRESERVATIVE FREE 10 ML: 5 INJECTION INTRAVENOUS at 20:58

## 2025-01-14 RX ADMIN — IPRATROPIUM BROMIDE AND ALBUTEROL SULFATE 1 DOSE: 2.5; .5 SOLUTION RESPIRATORY (INHALATION) at 07:49

## 2025-01-14 RX ADMIN — HYDROXYZINE HYDROCHLORIDE 10 MG: 10 TABLET ORAL at 09:40

## 2025-01-14 RX ADMIN — LEVOTHYROXINE SODIUM 175 MCG: 0.12 TABLET ORAL at 09:40

## 2025-01-14 RX ADMIN — METHYLPREDNISOLONE SODIUM SUCCINATE 40 MG: 40 INJECTION INTRAMUSCULAR; INTRAVENOUS at 23:55

## 2025-01-14 RX ADMIN — WATER 1 ML: 1 INJECTION INTRAMUSCULAR; INTRAVENOUS; SUBCUTANEOUS at 23:55

## 2025-01-14 RX ADMIN — LOSARTAN POTASSIUM 25 MG: 25 TABLET, FILM COATED ORAL at 09:40

## 2025-01-14 RX ADMIN — Medication 2 PUFF: at 22:09

## 2025-01-14 RX ADMIN — IPRATROPIUM BROMIDE AND ALBUTEROL SULFATE 1 DOSE: 2.5; .5 SOLUTION RESPIRATORY (INHALATION) at 16:02

## 2025-01-14 RX ADMIN — IPRATROPIUM BROMIDE AND ALBUTEROL SULFATE 1 DOSE: 2.5; .5 SOLUTION RESPIRATORY (INHALATION) at 20:15

## 2025-01-14 RX ADMIN — HYDROXYZINE HYDROCHLORIDE 10 MG: 10 TABLET ORAL at 20:57

## 2025-01-14 RX ADMIN — Medication 2 PUFF: at 09:37

## 2025-01-14 RX ADMIN — IPRATROPIUM BROMIDE AND ALBUTEROL SULFATE 1 DOSE: 2.5; .5 SOLUTION RESPIRATORY (INHALATION) at 00:27

## 2025-01-14 RX ADMIN — ENOXAPARIN SODIUM 30 MG: 100 INJECTION SUBCUTANEOUS at 20:57

## 2025-01-14 RX ADMIN — SODIUM CHLORIDE, PRESERVATIVE FREE 10 ML: 5 INJECTION INTRAVENOUS at 09:44

## 2025-01-14 RX ADMIN — CYCLOBENZAPRINE 10 MG: 10 TABLET, FILM COATED ORAL at 20:57

## 2025-01-14 RX ADMIN — ESCITALOPRAM OXALATE 10 MG: 10 TABLET ORAL at 09:40

## 2025-01-14 RX ADMIN — ASPIRIN 81 MG: 81 TABLET, COATED ORAL at 09:41

## 2025-01-14 RX ADMIN — PANTOPRAZOLE SODIUM 40 MG: 40 TABLET, DELAYED RELEASE ORAL at 09:43

## 2025-01-14 RX ADMIN — METHYLPREDNISOLONE SODIUM SUCCINATE 40 MG: 40 INJECTION INTRAMUSCULAR; INTRAVENOUS at 12:18

## 2025-01-14 ASSESSMENT — PAIN SCALES - GENERAL: PAINLEVEL_OUTOF10: 0

## 2025-01-14 NOTE — PROGRESS NOTES
Pulmonary Critical Care Progress Note     Patient's name:  Serg Burnette  Medical Record Number: [id number]  Patient's account/billing number: [id number]  Patient's YOB: 1965  Age: 59 y.o.  Date of Admission: 1/9/2025  7:41 PM  Date of Consult: 1/14/2025      Primary Care Physician: Catherine Mims APRN - CNP      Code Status: Full Code    Chief complaint: Acute respiratory failure with hypercapnia    Assessment and Plan     Acute respiratory failure with hypercapnia  COPD with acute exacerbation secondary to influenza  Obesity    Plan:  BiPAP intermittently during the day and continuous at night, he would benefit from long term bipap, will arrange before discharge.  We will obtain nocturnal pulse ox study on his home oxygen tomorrow  Titrate oxygen to keep sats 88 to 92%  Systemic steroid, continue solumedrol   DuoNeb and Symbicort  Azithromycin for 3 days          Overnight:  Had an episode of shortness of breath overnight    REVIEW OF SYSTEMS:  Review of Systems -   General ROS: negative  Psychological ROS: negative  Ophthalmic ROS: negative  ENT ROS: negative  Allergy and Immunology ROS: negative  Hematological and Lymphatic ROS: negative  Endocrine ROS: negative  Breast ROS: negative  Respiratory ROS: Shortness of breath  Cardiovascular ROS: Severe shortness of breath no chest pain  Gastrointestinal ROS:negative  Genito-Urinary ROS: negative  Musculoskeletal ROS: negative  Neurological ROS: negative  Dermatological ROS: negative        Physical Exam:    Vitals: /63   Pulse 84   Temp 98 °F (36.7 °C) (Oral)   Resp 17   Ht 1.651 m (5' 5\")   Wt 109.9 kg (242 lb 4.6 oz)   SpO2 99%   BMI 40.32 kg/m²     Last Body weight:   Wt Readings from Last 3 Encounters:   01/14/25 109.9 kg (242 lb 4.6 oz)   10/07/24 104.5 kg (230 lb 6.1 oz)       Body Mass Index : Body mass index is 40.32 kg/m².      Intake and Output summary:   Intake/Output Summary (Last 24 hours) at 1/14/2025 0935  Last

## 2025-01-14 NOTE — PROGRESS NOTES
V2.0    [facility] [place] Progress Note      Name:  Serg Burnette /Age/Sex: 1965  (59 y.o. male)   MRN & CSN:  [id number] & [id number] Encounter Date/Time: 2025 11:23 AM EST   Location:  [id number] PCP: Catherine Mims APRN - CNP     Attending:Nina Crain MD       Hospital Day: 6    Assessment and Recommendations   Serg Burnette is a 59 y.o. male with pmh of COPD, HLD, HTN who presents with COPD exacerbation (HCC), influenza A positive    Patient was examined this morning.  He was on 2-3 L nasal cannula.  Patient is on and off the BiPAP.  He feels mild improvement from yesterday  He is encouraged to stay off the BiPAP as long as possible.  Will obtain an oxygen overnight study        Pulmonology on board recommending BiPAP intermediately throughout the day and continuous at night, continue with systemic steroids, DuoNeb and Symbicort, and he completed a 3-day course of azithromycin IV      Labs this morning sodium 137, potassium 5.0, creatinine 0.8, white blood cell count 9.1, H&H stable at 13.1, 40.5        Plan:   COPD exacerbation  Azithromycin 500 mg IV daily  Budesonide breathing treatments  Systemic steroids  Continuous BiPAP  Pulmonology consult    2.  Hypertension  Patient is currently on amlodipine 10 mg, losartan 25 mg  Will follow vitals for further management if needed    3.  Hypothyroidism  Synthroid 175 mcg daily    4. influenza A  Pt tested neg on 2025  Tested fIu A+ 2025  TamifIu 75mg bid x 5 days-will discontinue  Supportive treatment    Will continue to follow, monitor and manage chronic medical conditions with medication listed below      Diet ADULT DIET; Regular; 5 carb choices (75 gm/meal); Low Fat/Low Chol/High Fiber/MADELYN   DVT Prophylaxis [x] Lovenox, []  Heparin, [] SCDs, [] Ambulation,  [] Eliquis, [] Xarelto  [] Coumadin   Code Status Full Code   Disposition From: Home  Expected Disposition: Home  Estimated Date of Discharge: 1-2 days  Patient requires continued  Pulses: +2 palpable, equal bilaterally       Medications:   Medications:    methylPREDNISolone  40 mg IntraVENous Q12H    budesonide-formoterol  2 puff Inhalation BID RT    amLODIPine  10 mg Oral Daily    aspirin  81 mg Oral Daily    cetirizine  10 mg Oral Daily    escitalopram  10 mg Oral Daily    levothyroxine  175 mcg Oral Daily    losartan  25 mg Oral Daily    pantoprazole  40 mg Oral QAM AC    sodium chloride flush  5-40 mL IntraVENous 2 times per day    enoxaparin  30 mg SubCUTAneous BID    ipratropium 0.5 mg-albuterol 2.5 mg  1 Dose Inhalation Q4H RT      Infusions:    sodium chloride 10 mL/hr at 01/12/25 1148     PRN Meds: cyclobenzaprine, 10 mg, TID PRN  hydrOXYzine HCl, 10 mg, TID PRN  sodium chloride flush, 5-40 mL, PRN  sodium chloride, , PRN  ondansetron, 4 mg, Q8H PRN   Or  ondansetron, 4 mg, Q6H PRN  polyethylene glycol, 17 g, Daily PRN  acetaminophen, 650 mg, Q6H PRN   Or  acetaminophen, 650 mg, Q6H PRN  aluminum & magnesium hydroxide-simethicone, 30 mL, Q6H PRN  albuterol, 2.5 mg, Q2H PRN  guaiFENesin-dextromethorphan, 5 mL, Q4H PRN        Labs and Imaging   XR CHEST PORTABLE    Result Date: 1/9/2025  EXAMINATION: ONE XRAY VIEW OF THE CHEST 1/9/2025 8:10 pm COMPARISON: 10/02/2024. HISTORY: ORDERING SYSTEM PROVIDED HISTORY: SOB TECHNOLOGIST PROVIDED HISTORY: Reason for exam:->SOB Reason for Exam: sob FINDINGS: Overlying items external to the patient somewhat limit evaluation. Mild bibasilar linear scarring versus atelectasis, left more than right.  No focal consolidations, pleural effusions or pulmonary edema.  No pneumothoraces.  Cardiac and mediastinal silhouettes are stable.  No acute osseous abnormality.     No acute abnormality.       CBC:   Recent Labs     01/12/25  0539 01/13/25  0542 01/14/25  0541   WBC 13.7* 10.2 9.1   HGB 12.8* 12.8* 13.1*    331 323     BMP:    Recent Labs     01/12/25  0539 01/13/25  0542 01/14/25  0541    140 137   K 4.9 4.8 5.0   CL 98* 98* 96*   CO2 31 32

## 2025-01-14 NOTE — PLAN OF CARE
Problem: Discharge Planning  Goal: Discharge to home or other facility with appropriate resources  1/14/2025 0952 by Odette Sandoval RN  Outcome: Progressing     Problem: Pain  Goal: Verbalizes/displays adequate comfort level or baseline comfort level  1/14/2025 0952 by Odette Sandoval RN  Outcome: Progressing     Problem: Safety - Adult  Goal: Free from fall injury  1/14/2025 0952 by Odette Sandoval RN  Outcome: Progressing     Problem: Respiratory - Adult  Goal: Achieves optimal ventilation and oxygenation  1/14/2025 0952 by Odette Sandoval RN  Outcome: Progressing     Problem: Hematologic - Adult  Goal: Maintains hematologic stability  1/14/2025 0952 by Odette Sandoval RN  Outcome: Progressing

## 2025-01-14 NOTE — PROGRESS NOTES
Patient does not want to be waken up this morning. Refused respiratory tx this morning and morning labs. Labs re timed for later this morning. Bed weight obtained and will get standing weight when patient is up out to bed. Will continued to monitor.

## 2025-01-14 NOTE — PLAN OF CARE
Problem: Discharge Planning  Goal: Discharge to home or other facility with appropriate resources  Outcome: Progressing     Problem: Pain  Goal: Verbalizes/displays adequate comfort level or baseline comfort level  Outcome: Progressing     Problem: Safety - Adult  Goal: Free from fall injury  Outcome: Progressing     Problem: Respiratory - Adult  Goal: Achieves optimal ventilation and oxygenation  Outcome: Progressing     Problem: Hematologic - Adult  Goal: Maintains hematologic stability  Outcome: Progressing

## 2025-01-14 NOTE — CARE COORDINATION
Discharge Planning:   Per discussion with MD, patient likely to need Home Bipap.   Requested Overnight Pulse Oximetry study for patient from MD.   Order placed.     [facility]: placed referral with Keila.      BOB Page, LSW, Social Work/Case Management   [phone]  Electronically signed by BOB Page on 1/14/2025 at 10:15 AM

## 2025-01-15 LAB
ALBUMIN SERPL-MCNC: 4 G/DL (ref 3.4–5)
ALBUMIN/GLOB SERPL: 1.5 {RATIO} (ref 1.1–2.2)
ALP SERPL-CCNC: 68 U/L (ref 40–129)
ALT SERPL-CCNC: 16 U/L (ref 10–40)
ANION GAP SERPL CALCULATED.3IONS-SCNC: 9 MMOL/L (ref 3–16)
AST SERPL-CCNC: 24 U/L (ref 15–37)
BILIRUB SERPL-MCNC: 0.6 MG/DL (ref 0–1)
BUN SERPL-MCNC: 24 MG/DL (ref 7–20)
CALCIUM SERPL-MCNC: 9.2 MG/DL (ref 8.3–10.6)
CHLORIDE SERPL-SCNC: 97 MMOL/L (ref 99–110)
CO2 SERPL-SCNC: 32 MMOL/L (ref 21–32)
CREAT SERPL-MCNC: 0.8 MG/DL (ref 0.9–1.3)
DEPRECATED RDW RBC AUTO: 16 % (ref 12.4–15.4)
GFR SERPLBLD CREATININE-BSD FMLA CKD-EPI: >90 ML/MIN/{1.73_M2}
GLUCOSE SERPL-MCNC: 115 MG/DL (ref 70–99)
HCT VFR BLD AUTO: 41.5 % (ref 40.5–52.5)
HGB BLD-MCNC: 13.4 G/DL (ref 13.5–17.5)
MCH RBC QN AUTO: 27 PG (ref 26–34)
MCHC RBC AUTO-ENTMCNC: 32.3 G/DL (ref 31–36)
MCV RBC AUTO: 83.7 FL (ref 80–100)
PLATELET # BLD AUTO: 332 K/UL (ref 135–450)
PMV BLD AUTO: 9.4 FL (ref 5–10.5)
POTASSIUM SERPL-SCNC: 5 MMOL/L (ref 3.5–5.1)
PROT SERPL-MCNC: 6.7 G/DL (ref 6.4–8.2)
RBC # BLD AUTO: 4.95 M/UL (ref 4.2–5.9)
SODIUM SERPL-SCNC: 138 MMOL/L (ref 136–145)
WBC # BLD AUTO: 8.8 K/UL (ref 4–11)

## 2025-01-15 PROCEDURE — 6360000002 HC RX W HCPCS: Performed by: INTERNAL MEDICINE

## 2025-01-15 PROCEDURE — 2500000003 HC RX 250 WO HCPCS

## 2025-01-15 PROCEDURE — 85027 COMPLETE CBC AUTOMATED: CPT

## 2025-01-15 PROCEDURE — 6370000000 HC RX 637 (ALT 250 FOR IP): Performed by: INTERNAL MEDICINE

## 2025-01-15 PROCEDURE — 99233 SBSQ HOSP IP/OBS HIGH 50: CPT | Performed by: INTERNAL MEDICINE

## 2025-01-15 PROCEDURE — 2700000000 HC OXYGEN THERAPY PER DAY

## 2025-01-15 PROCEDURE — 80053 COMPREHEN METABOLIC PANEL: CPT

## 2025-01-15 PROCEDURE — 94640 AIRWAY INHALATION TREATMENT: CPT

## 2025-01-15 PROCEDURE — 2500000003 HC RX 250 WO HCPCS: Performed by: INTERNAL MEDICINE

## 2025-01-15 PROCEDURE — 94761 N-INVAS EAR/PLS OXIMETRY MLT: CPT

## 2025-01-15 PROCEDURE — 2060000000 HC ICU INTERMEDIATE R&B

## 2025-01-15 PROCEDURE — 36415 COLL VENOUS BLD VENIPUNCTURE: CPT

## 2025-01-15 PROCEDURE — 6370000000 HC RX 637 (ALT 250 FOR IP): Performed by: FAMILY MEDICINE

## 2025-01-15 PROCEDURE — 94762 N-INVAS EAR/PLS OXIMTRY CONT: CPT

## 2025-01-15 RX ORDER — WATER 10 ML/10ML
INJECTION INTRAMUSCULAR; INTRAVENOUS; SUBCUTANEOUS
Status: COMPLETED
Start: 2025-01-15 | End: 2025-01-15

## 2025-01-15 RX ORDER — METHYLPREDNISOLONE SODIUM SUCCINATE 40 MG/ML
40 INJECTION INTRAMUSCULAR; INTRAVENOUS DAILY
Status: DISCONTINUED | OUTPATIENT
Start: 2025-01-15 | End: 2025-01-16

## 2025-01-15 RX ADMIN — CYCLOBENZAPRINE 10 MG: 10 TABLET, FILM COATED ORAL at 09:04

## 2025-01-15 RX ADMIN — CETIRIZINE HYDROCHLORIDE 10 MG: 10 TABLET, FILM COATED ORAL at 09:04

## 2025-01-15 RX ADMIN — SODIUM CHLORIDE, PRESERVATIVE FREE 10 ML: 5 INJECTION INTRAVENOUS at 20:05

## 2025-01-15 RX ADMIN — HYDROXYZINE HYDROCHLORIDE 10 MG: 10 TABLET ORAL at 21:47

## 2025-01-15 RX ADMIN — WATER 1 ML: 1 INJECTION INTRAMUSCULAR; INTRAVENOUS; SUBCUTANEOUS at 15:01

## 2025-01-15 RX ADMIN — AMLODIPINE BESYLATE 10 MG: 10 TABLET ORAL at 09:04

## 2025-01-15 RX ADMIN — PANTOPRAZOLE SODIUM 40 MG: 40 TABLET, DELAYED RELEASE ORAL at 06:26

## 2025-01-15 RX ADMIN — Medication 2 PUFF: at 08:37

## 2025-01-15 RX ADMIN — METHYLPREDNISOLONE SODIUM SUCCINATE 40 MG: 40 INJECTION INTRAMUSCULAR; INTRAVENOUS at 15:01

## 2025-01-15 RX ADMIN — LEVOTHYROXINE SODIUM 175 MCG: 0.12 TABLET ORAL at 09:04

## 2025-01-15 RX ADMIN — ALBUTEROL SULFATE 2.5 MG: 2.5 SOLUTION RESPIRATORY (INHALATION) at 18:26

## 2025-01-15 RX ADMIN — ALBUTEROL SULFATE 2.5 MG: 2.5 SOLUTION RESPIRATORY (INHALATION) at 06:40

## 2025-01-15 RX ADMIN — SODIUM CHLORIDE, PRESERVATIVE FREE 10 ML: 5 INJECTION INTRAVENOUS at 09:06

## 2025-01-15 RX ADMIN — IPRATROPIUM BROMIDE AND ALBUTEROL SULFATE 1 DOSE: 2.5; .5 SOLUTION RESPIRATORY (INHALATION) at 13:22

## 2025-01-15 RX ADMIN — ENOXAPARIN SODIUM 30 MG: 100 INJECTION SUBCUTANEOUS at 20:04

## 2025-01-15 RX ADMIN — HYDROXYZINE HYDROCHLORIDE 10 MG: 10 TABLET ORAL at 09:12

## 2025-01-15 RX ADMIN — IPRATROPIUM BROMIDE AND ALBUTEROL SULFATE 1 DOSE: 2.5; .5 SOLUTION RESPIRATORY (INHALATION) at 20:20

## 2025-01-15 RX ADMIN — LOSARTAN POTASSIUM 25 MG: 25 TABLET, FILM COATED ORAL at 09:04

## 2025-01-15 RX ADMIN — ASPIRIN 81 MG: 81 TABLET, COATED ORAL at 09:04

## 2025-01-15 RX ADMIN — ESCITALOPRAM OXALATE 10 MG: 10 TABLET ORAL at 09:05

## 2025-01-15 RX ADMIN — ENOXAPARIN SODIUM 30 MG: 100 INJECTION SUBCUTANEOUS at 09:05

## 2025-01-15 NOTE — CARE COORDINATION
Discharge planning:   Patient had   Nocturnal pulse oximetry completed and placed in pt soft chart.      [facility]: notified Keila in admissions of the test completed in chart.     BOB Page, LSW, Social Work/Case Management   [phone]  Electronically signed by BOB Page on 1/15/2025 at 8:38 AM

## 2025-01-15 NOTE — PLAN OF CARE
Problem: Discharge Planning  Goal: Discharge to home or other facility with appropriate resources  1/15/2025 0759 by Catalina Meyer RN  Outcome: Progressing  1/14/2025 2117 by Patrick Gan RN  Outcome: Progressing     Problem: Pain  Goal: Verbalizes/displays adequate comfort level or baseline comfort level  1/15/2025 0759 by Catalina Meyer RN  Outcome: Progressing  1/14/2025 2117 by Patrick Gan RN  Outcome: Progressing     Problem: Safety - Adult  Goal: Free from fall injury  1/15/2025 0759 by Catalina Meyer RN  Outcome: Progressing  1/14/2025 2117 by Patrick Gan RN  Outcome: Progressing     Problem: Respiratory - Adult  Goal: Achieves optimal ventilation and oxygenation  1/15/2025 0759 by Catalina Meyer RN  Outcome: Progressing  1/14/2025 2117 by Patrick Gan RN  Outcome: Progressing     Problem: Hematologic - Adult  Goal: Maintains hematologic stability  1/15/2025 0759 by Catalina Meyer RN  Outcome: Progressing  1/14/2025 2117 by Patrick Gan RN  Outcome: Progressing

## 2025-01-15 NOTE — PROGRESS NOTES
V2.0    [facility] [place]      Name:  Serg Burnette /Age/Sex: 1965  (59 y.o. male)   MRN & CSN:  [id number] & [id number] Encounter Date/Time: 1/15/2025 11:23 AM EST   Location:  M7D-7788/5111-01 PCP: Catherine iMms APRN - CNP     Attending:Nina Crain MD       Hospital Day: 7    Assessment and Recommendations   Serg Burnette is a 59 y.o. male with pmh of COPD, HLD, HTN who presents with COPD exacerbation (HCC), influenza A positive    Patient was examined this morning.  Wife is at the bedside  He continues to be on 2-3 L nasal cannula.  Patient is on and off the BiPAP.    Feels much improved from previous days.    Overnight pulse ox will be repeated again tonight on 1 L versus 2 L yesterday.          Pulmonology on board recommending BiPAP intermediately throughout the day and continuous at night, continue with systemic steroids, DuoNeb and Symbicort, and he completed a 3-day course of azithromycin IV  Patient would also benefit from BiPAP at home, will be arranged by pulmonology      Plan:   COPD exacerbation  Azithromycin 500 mg IV daily  Budesonide breathing treatments  Systemic steroids  Continuous BiPAP  Pulmonology consult    2.  Hypertension  Patient is currently on amlodipine 10 mg, losartan 25 mg  Will follow vitals for further management if needed    3.  Hypothyroidism  Synthroid 175 mcg daily    4. influenza A  Pt tested neg on 2025  Tested fIu A+ 2025  TamifIu 75mg bid x 5 days-will discontinue  Supportive treatment    Will continue to follow, monitor and manage chronic medical conditions with medication listed below      Diet ADULT DIET; Regular; 5 carb choices (75 gm/meal); Low Fat/Low Chol/High Fiber/MADELYN   DVT Prophylaxis [x] Lovenox, []  Heparin, [] SCDs, [] Ambulation,  [] Eliquis, [] Xarelto  [] Coumadin   Code Status Full Code   Disposition From: Home  Expected Disposition: Home  Estimated Date of Discharge: 1-2 days  Patient requires continued admission due to  upon clinical improvement   Surrogate Decision Maker/ POA Self     Personally reviewed Lab Studies and Imaging       Medical Decision Making:  The following items were considered in medical decision making:  Discussion of patient care with other providers  Reviewed clinical lab tests  Reviewed radiology tests  Reviewed other diagnostic tests/interventions  Independent review of radiologic images  Microbiology cultures and other micro tests reviewed      Subjective:     Chief Complaint: Shortness of breath    Serg Burnette is a 59 y.o. male who presents with shortness of breath      Review of Systems:      Pertinent positives and negatives discussed in HPI    Objective:     Intake/Output Summary (Last 24 hours) at 1/15/2025 1212  Last data filed at 1/15/2025 0906  Gross per 24 hour   Intake 370 ml   Output 550 ml   Net -180 ml      Vitals:   Vitals:    01/15/25 0728 01/15/25 0832 01/15/25 0833 01/15/25 0834   BP: (!) 142/85      Pulse:  81 89 84   Resp: 16 18     Temp: 98.6 °F (37 °C)      TempSrc: Oral      SpO2:  96% 98% 96%   Weight:       Height:             Physical Exam:      Physical Exam Performed:    BP (!) 142/85   Pulse 84   Temp 98.6 °F (37 °C) (Oral)   Resp 18   Ht 1.651 m (5' 5\")   Wt 102.3 kg (225 lb 8.5 oz)   SpO2 96%   BMI 37.53 kg/m²     General appearance: No apparent distress, cooperative  HEENT: Pupils equal, round, and reactive to light.   Neck: Supple, with full range of motion. Trachea midline.  Respiratory:  Normal respiratory effort.  Rhonchi throughout  Cardiovascular: Regular rate and rhythm with normal S1/S2   Abdomen: Soft, non-tender, non-distended with normal bowel sounds.  Musculoskeletal: No edema bilaterally.  Full range of motion without deformity.  Skin: Skin color, texture, turgor normal.  No rashes or lesions.  Neurologic:  Neurovascularly intact  Psychiatric: Alert and oriented  Capillary Refill: Brisk, 3 seconds, normal   Peripheral Pulses: +2 palpable, equal

## 2025-01-15 NOTE — PROGRESS NOTES
[facility]  Respiratory Therapy  Overnight Oximetry    Name:  Serg Burnette  Medical Record Number:  [id number]  Age: 59 y.o.   : 1965  Today's Date:  2025  Room:  A6M-2360/5111-01      Assessment      /66   Pulse 99   Temp 98.1 °F (36.7 °C) (Oral)   Resp 22   Ht 1.651 m (5' 5\")   Wt 109.9 kg (242 lb 4.6 oz)   SpO2 98%   BMI 40.32 kg/m²     Patient Active Problem List   Diagnosis    COPD exacerbation (HCC)    Chest pain    Acute respiratory failure with hypoxia and hypercapnia    Essential hypertension       Social History  Social History     Tobacco Use    Smoking status: Former   Substance Use Topics    Alcohol use: No    Drug use: No         Patient has been placed on recording overnight pulse oximeter and was placed on  2 L/min O2 via nasal cannula. RN was notified. Patient and RN are both aware that the patient should not become disconnected from pulse oximeter until RT removes the unit from the room in the morning. RN is aware that the patient must desat to 85% for 5 minutes before being placed on oxygen. Patient's SpO2 is currently 97% on  2 L/min O2 via nasal cannula. Will continue to monitor.    Electronically signed by Dino Villanueva RCP on 2025 at 10:15 PM        Patient/caregiver was educated on the proper method of use:  Yes      Level of patient/caregiver understanding able to:   [x] Verbalize understanding   [x] Demonstrate understanding       [] Teach back        [] Needs reinforcement        []  No available caregiver               []  Other:     Response to education:  Excellent     Teaching Time:  5  minutes     Electronically signed by Dino Villanueva RCP on 2025 at 10:15 PM

## 2025-01-15 NOTE — PROGRESS NOTES
Nocturnal pulse oximetry completed and placed in pt soft chart.    Patient will contact [address] [place] [address] with copay assistance info.

## 2025-01-15 NOTE — PLAN OF CARE
Problem: Discharge Planning  Goal: Discharge to home or other facility with appropriate resources  1/14/2025 2117 by Patrick Gan RN  Outcome: Progressing     Problem: Pain  Goal: Verbalizes/displays adequate comfort level or baseline comfort level  1/14/2025 2117 by Patrick Gan RN  Outcome: Progressing     Problem: Safety - Adult  Goal: Free from fall injury  1/14/2025 2117 by Patrick Gan RN  Outcome: Progressing     Problem: Respiratory - Adult  Goal: Achieves optimal ventilation and oxygenation  1/14/2025 2117 by Patrick Gan RN  Outcome: Progressing     Problem: Hematologic - Adult  Goal: Maintains hematologic stability  1/14/2025 2117 by Patrick Gan RN  Outcome: Progressing

## 2025-01-15 NOTE — PROGRESS NOTES
Pulmonary Critical Care Progress Note     Patient's name:  Serg Burnette  Medical Record Number: [id number]  Patient's account/billing number: [id number]  Patient's YOB: 1965  Age: 59 y.o.  Date of Admission: 1/9/2025  7:41 PM  Date of Consult: 1/15/2025      Primary Care Physician: Catherine Mims APRN - CNP      Code Status: Full Code    Chief complaint: Acute respiratory failure with hypercapnia    Assessment and Plan     Acute respiratory failure with hypercapnia  COPD with acute exacerbation secondary to influenza  Obesity    Plan:  We will obtain nocturnal pulse ox tonight on 1 L of oxygen.  BiPAP intermittently during the day and continuous at night, he would benefit from long term bipap, will arrange before discharge.    Systemic steroid, continue solumedrol reduced to once a day  DuoNeb and Symbicort            Overnight:  Had an episode of shortness of breath overnight    REVIEW OF SYSTEMS:  Review of Systems -   General ROS: negative  Psychological ROS: negative  Ophthalmic ROS: negative  ENT ROS: negative  Allergy and Immunology ROS: negative  Hematological and Lymphatic ROS: negative  Endocrine ROS: negative  Breast ROS: negative  Respiratory ROS: Shortness of breath  Cardiovascular ROS: Severe shortness of breath no chest pain  Gastrointestinal ROS:negative  Genito-Urinary ROS: negative  Musculoskeletal ROS: negative  Neurological ROS: negative  Dermatological ROS: negative        Physical Exam:    Vitals: BP (!) 142/85   Pulse 84   Temp 98.6 °F (37 °C) (Oral)   Resp 18   Ht 1.651 m (5' 5\")   Wt 102.3 kg (225 lb 8.5 oz)   SpO2 96%   BMI 37.53 kg/m²     Last Body weight:   Wt Readings from Last 3 Encounters:   01/15/25 102.3 kg (225 lb 8.5 oz)   10/07/24 104.5 kg (230 lb 6.1 oz)       Body Mass Index : Body mass index is 37.53 kg/m².      Intake and Output summary:   Intake/Output Summary (Last 24 hours) at 1/15/2025 1207  Last data filed at 1/15/2025 0906  Gross per 24  hour   Intake 370 ml   Output 550 ml   Net -180 ml       Physical Examination:     Gen: Moderate distress  Eyes: PERRL. Anicteric sclera. No conjunctival injection.   ENT: No discharge. Posterior oropharynx clear. External appearance of ears and nose normal.  Neck: Trachea midline. No mass   Resp: Increased work of breathing diminished breath sounds severely bilaterally  CV: Regular rate. Regular rhythm. No murmur or rub.  Trace edema.   GI: Soft, Non-tender. Non-distended. +BS  Skin: Warm, dry, w/o erythema.   Lymph: No cervical or supraclavicular LAD.   M/S: No cyanosis. No clubbing.    Neuro:  CN 2-12 tested, no focal neurologic deficit.  Moves all extremities  Psych: Awake and alert, Oriented x 3.  Judgement and insight appropriate.  Mood stable.        Laboratory findings:-    CBC:   Recent Labs     01/15/25  0602   WBC 8.8   HGB 13.4*        BMP:    Recent Labs     01/13/25  0542 01/14/25  0541 01/15/25  0602    137 138   K 4.8 5.0 5.0   CL 98* 96* 97*   CO2 32 34* 32   BUN 21* 22* 24*   CREATININE 0.9 0.8* 0.8*   GLUCOSE 105* 109* 115*     S. Calcium:  Recent Labs     01/15/25  0602   CALCIUM 9.2     No results for input(s): \"PHOS\" in the last 72 hours.        Radiology Review:  Pertinent images / reports were reviewed as a part of this visit.        Vladimir Rojas MD, M.D.            1/15/2025, 12:07 PM

## 2025-01-16 LAB
ALBUMIN SERPL-MCNC: 4.1 G/DL (ref 3.4–5)
ALBUMIN/GLOB SERPL: 1.5 {RATIO} (ref 1.1–2.2)
ALP SERPL-CCNC: 70 U/L (ref 40–129)
ALT SERPL-CCNC: 15 U/L (ref 10–40)
ANION GAP SERPL CALCULATED.3IONS-SCNC: 9 MMOL/L (ref 3–16)
AST SERPL-CCNC: 21 U/L (ref 15–37)
BILIRUB SERPL-MCNC: 0.7 MG/DL (ref 0–1)
BUN SERPL-MCNC: 22 MG/DL (ref 7–20)
CALCIUM SERPL-MCNC: 9.3 MG/DL (ref 8.3–10.6)
CHLORIDE SERPL-SCNC: 99 MMOL/L (ref 99–110)
CO2 SERPL-SCNC: 33 MMOL/L (ref 21–32)
CREAT SERPL-MCNC: 0.8 MG/DL (ref 0.9–1.3)
DEPRECATED RDW RBC AUTO: 15.6 % (ref 12.4–15.4)
GFR SERPLBLD CREATININE-BSD FMLA CKD-EPI: >90 ML/MIN/{1.73_M2}
GLUCOSE SERPL-MCNC: 118 MG/DL (ref 70–99)
HCT VFR BLD AUTO: 42.8 % (ref 40.5–52.5)
HGB BLD-MCNC: 13.8 G/DL (ref 13.5–17.5)
MCH RBC QN AUTO: 27.2 PG (ref 26–34)
MCHC RBC AUTO-ENTMCNC: 32.2 G/DL (ref 31–36)
MCV RBC AUTO: 84.2 FL (ref 80–100)
PLATELET # BLD AUTO: 345 K/UL (ref 135–450)
PMV BLD AUTO: 9 FL (ref 5–10.5)
POTASSIUM SERPL-SCNC: 4.3 MMOL/L (ref 3.5–5.1)
PROT SERPL-MCNC: 6.9 G/DL (ref 6.4–8.2)
RBC # BLD AUTO: 5.08 M/UL (ref 4.2–5.9)
SODIUM SERPL-SCNC: 141 MMOL/L (ref 136–145)
WBC # BLD AUTO: 11.7 K/UL (ref 4–11)

## 2025-01-16 PROCEDURE — 85027 COMPLETE CBC AUTOMATED: CPT

## 2025-01-16 PROCEDURE — 6370000000 HC RX 637 (ALT 250 FOR IP): Performed by: STUDENT IN AN ORGANIZED HEALTH CARE EDUCATION/TRAINING PROGRAM

## 2025-01-16 PROCEDURE — 2500000003 HC RX 250 WO HCPCS: Performed by: INTERNAL MEDICINE

## 2025-01-16 PROCEDURE — 6370000000 HC RX 637 (ALT 250 FOR IP): Performed by: FAMILY MEDICINE

## 2025-01-16 PROCEDURE — 94761 N-INVAS EAR/PLS OXIMETRY MLT: CPT

## 2025-01-16 PROCEDURE — 2060000000 HC ICU INTERMEDIATE R&B

## 2025-01-16 PROCEDURE — 94640 AIRWAY INHALATION TREATMENT: CPT

## 2025-01-16 PROCEDURE — 6370000000 HC RX 637 (ALT 250 FOR IP): Performed by: INTERNAL MEDICINE

## 2025-01-16 PROCEDURE — 80053 COMPREHEN METABOLIC PANEL: CPT

## 2025-01-16 PROCEDURE — 6360000002 HC RX W HCPCS: Performed by: INTERNAL MEDICINE

## 2025-01-16 PROCEDURE — 99233 SBSQ HOSP IP/OBS HIGH 50: CPT | Performed by: INTERNAL MEDICINE

## 2025-01-16 PROCEDURE — 36415 COLL VENOUS BLD VENIPUNCTURE: CPT

## 2025-01-16 PROCEDURE — 2500000003 HC RX 250 WO HCPCS

## 2025-01-16 PROCEDURE — 2700000000 HC OXYGEN THERAPY PER DAY

## 2025-01-16 RX ORDER — GUAIFENESIN 600 MG/1
600 TABLET, EXTENDED RELEASE ORAL 2 TIMES DAILY
Qty: 12 TABLET | Refills: 0 | Status: SHIPPED | OUTPATIENT
Start: 2025-01-16

## 2025-01-16 RX ORDER — PREDNISONE 20 MG/1
20 TABLET ORAL DAILY
Status: DISCONTINUED | OUTPATIENT
Start: 2025-01-23 | End: 2025-01-17 | Stop reason: HOSPADM

## 2025-01-16 RX ORDER — BUDESONIDE AND FORMOTEROL FUMARATE DIHYDRATE 160; 4.5 UG/1; UG/1
2 AEROSOL RESPIRATORY (INHALATION) 2 TIMES DAILY
Qty: 10.2 G | Refills: 0 | Status: SHIPPED | OUTPATIENT
Start: 2025-01-16

## 2025-01-16 RX ORDER — PREDNISONE 20 MG/1
40 TABLET ORAL DAILY
Status: DISCONTINUED | OUTPATIENT
Start: 2025-01-17 | End: 2025-01-17 | Stop reason: HOSPADM

## 2025-01-16 RX ORDER — GUAIFENESIN 600 MG/1
600 TABLET, EXTENDED RELEASE ORAL 2 TIMES DAILY
Status: DISCONTINUED | OUTPATIENT
Start: 2025-01-16 | End: 2025-01-17 | Stop reason: HOSPADM

## 2025-01-16 RX ORDER — PREDNISONE 10 MG/1
10 TABLET ORAL DAILY
Status: DISCONTINUED | OUTPATIENT
Start: 2025-01-26 | End: 2025-01-17 | Stop reason: HOSPADM

## 2025-01-16 RX ORDER — WATER 10 ML/10ML
INJECTION INTRAMUSCULAR; INTRAVENOUS; SUBCUTANEOUS
Status: COMPLETED
Start: 2025-01-16 | End: 2025-01-16

## 2025-01-16 RX ORDER — BUDESONIDE 0.25 MG/2ML
250 INHALANT ORAL
Qty: 60 EACH | Refills: 0 | Status: SHIPPED | OUTPATIENT
Start: 2025-01-16

## 2025-01-16 RX ORDER — ALBUTEROL SULFATE 90 UG/1
2 INHALANT RESPIRATORY (INHALATION) EVERY 6 HOURS PRN
Qty: 18 G | Refills: 0 | Status: SHIPPED | OUTPATIENT
Start: 2025-01-16

## 2025-01-16 RX ORDER — PREDNISONE 10 MG/1
TABLET ORAL
Qty: 30 TABLET | Refills: 0 | Status: SHIPPED | OUTPATIENT
Start: 2025-01-17 | End: 2025-01-28

## 2025-01-16 RX ADMIN — PANTOPRAZOLE SODIUM 40 MG: 40 TABLET, DELAYED RELEASE ORAL at 06:40

## 2025-01-16 RX ADMIN — METHYLPREDNISOLONE SODIUM SUCCINATE 40 MG: 40 INJECTION INTRAMUSCULAR; INTRAVENOUS at 08:57

## 2025-01-16 RX ADMIN — SODIUM CHLORIDE, PRESERVATIVE FREE 10 ML: 5 INJECTION INTRAVENOUS at 08:57

## 2025-01-16 RX ADMIN — CETIRIZINE HYDROCHLORIDE 10 MG: 10 TABLET, FILM COATED ORAL at 08:57

## 2025-01-16 RX ADMIN — IPRATROPIUM BROMIDE AND ALBUTEROL SULFATE 1 DOSE: 2.5; .5 SOLUTION RESPIRATORY (INHALATION) at 14:28

## 2025-01-16 RX ADMIN — ENOXAPARIN SODIUM 30 MG: 100 INJECTION SUBCUTANEOUS at 20:06

## 2025-01-16 RX ADMIN — Medication 2 PUFF: at 20:12

## 2025-01-16 RX ADMIN — Medication 2 PUFF: at 07:35

## 2025-01-16 RX ADMIN — GUAIFENESIN 600 MG: 600 TABLET ORAL at 20:06

## 2025-01-16 RX ADMIN — LOSARTAN POTASSIUM 25 MG: 25 TABLET, FILM COATED ORAL at 08:57

## 2025-01-16 RX ADMIN — IPRATROPIUM BROMIDE AND ALBUTEROL SULFATE 1 DOSE: 2.5; .5 SOLUTION RESPIRATORY (INHALATION) at 07:35

## 2025-01-16 RX ADMIN — CYCLOBENZAPRINE 10 MG: 10 TABLET, FILM COATED ORAL at 20:06

## 2025-01-16 RX ADMIN — WATER 10 ML: 1 INJECTION INTRAMUSCULAR; INTRAVENOUS; SUBCUTANEOUS at 08:57

## 2025-01-16 RX ADMIN — LEVOTHYROXINE SODIUM 175 MCG: 0.12 TABLET ORAL at 08:56

## 2025-01-16 RX ADMIN — ASPIRIN 81 MG: 81 TABLET, COATED ORAL at 08:56

## 2025-01-16 RX ADMIN — ENOXAPARIN SODIUM 30 MG: 100 INJECTION SUBCUTANEOUS at 08:57

## 2025-01-16 RX ADMIN — HYDROXYZINE HYDROCHLORIDE 10 MG: 10 TABLET ORAL at 15:35

## 2025-01-16 RX ADMIN — AMLODIPINE BESYLATE 10 MG: 10 TABLET ORAL at 08:57

## 2025-01-16 RX ADMIN — ESCITALOPRAM OXALATE 10 MG: 10 TABLET ORAL at 08:57

## 2025-01-16 RX ADMIN — GUAIFENESIN 600 MG: 600 TABLET ORAL at 09:47

## 2025-01-16 ASSESSMENT — PAIN - FUNCTIONAL ASSESSMENT: PAIN_FUNCTIONAL_ASSESSMENT: ACTIVITIES ARE NOT PREVENTED

## 2025-01-16 ASSESSMENT — PAIN DESCRIPTION - LOCATION
LOCATION: LEG
LOCATION: GENERALIZED

## 2025-01-16 ASSESSMENT — PAIN DESCRIPTION - DESCRIPTORS
DESCRIPTORS: ACHING
DESCRIPTORS: ACHING

## 2025-01-16 ASSESSMENT — PAIN SCALES - GENERAL
PAINLEVEL_OUTOF10: 0
PAINLEVEL_OUTOF10: 5
PAINLEVEL_OUTOF10: 7

## 2025-01-16 ASSESSMENT — PAIN DESCRIPTION - ORIENTATION: ORIENTATION: LOWER

## 2025-01-16 NOTE — PROGRESS NOTES
CLINICAL PHARMACY NOTE: MEDS TO BEDS    Total # of Prescriptions Filled: 5   The following medications were delivered to the patient:  Current Discharge Medication List        START taking these medications    Details   predniSONE (DELTASONE) 10 MG tablet Take 4 tablets by mouth daily for 3 days, THEN 3 tablets daily for 3 days, THEN 2 tablets daily for 3 days, THEN 1 tablet daily for 3 days.  Qty: 30 tablet, Refills: 0      guaiFENesin (MUCINEX) 600 MG extended release tablet Take 1 tablet by mouth 2 times daily  Qty: 12 tablet, Refills: 0         albuterol sulfate  (90 Base) MCG/ACT inhaler  budesonide 0.25 MG/2ML nebulizer suspension  Symbicort 160-4.5 MCG/ACT Aero      Additional Documentation: Prednisone and mucinex tubed to [facility]. Albuterol, budesonide (only had 60ml in stock), and Symbicort dropped off to patient in his room.

## 2025-01-16 NOTE — PROGRESS NOTES
V2.0    [facility] [place] Progress Note      Name:  Serg Burnette /Age/Sex: 1965  (59 y.o. male)   MRN & CSN:  [id number] & [id number] Encounter Date/Time: 2025 3:29 PM EST   Location:  I1K-5452/5111-01 PCP: Catherine Mims APRN - BRENDA     Attending:Brody Ruiz DO       Hospital Day: 8  Brief Hospital Course  Serg Burnette is a 59 y.o. male with pertinent PMHx of COPD, HTN, HLD who presented with acute onset increased shortness of breath as well as cough and chills.  He was found to have an acute exacerbation of COPD and was admitted for further evaluation.  He was started on NIV with BiPAP and was treated with DuoNeb breathing treatments and systemic steroids which were later transitioned to prednisone taper.  He also completed a course of IV azithromycin.  During his admission he was found to be influenza A positive and was treated with Tamiflu.  Pulmonology was consulted and has been following.  Assessment & Plan     COPD with acute exacerbation  -S/p course of IV azithromycin  -Symbicort  -DuoNebs and budesonide  -Prednisone taper  -Continue home baseline 2 L supplemental oxygen via nasal cannula, wean as tolerated, may need repeat home O2 eval as desats with movement  -Pulmonology following  -Unfortunately does not qualify for home NIV based off of overnight pulse ox at this time    Influenza A, improved  -Tested positive on   -S/p Tamiflu    Essential hypertension  -Continue amlodipine and losartan    Hypothyroidism  -Continue Synthroid      Diet ADULT DIET; Regular; 5 carb choices (75 gm/meal); Low Fat/Low Chol/High Fiber/MADELYN   DVT Prophylaxis [x] Lovenox, []  Heparin, [] SCDs, [] Ambulation,  [] Eliquis, [] Xarelto  [] Coumadin   Code Status Full Code   Disposition From: Home  Expected Disposition: Home  Estimated Date of Discharge:            Subjective:     Chief Complaint: Shortness of breath    Patient was seen and examined today sitting up at edge of bed with wife at bedside  Still

## 2025-01-16 NOTE — PLAN OF CARE
Problem: Discharge Planning  Goal: Discharge to home or other facility with appropriate resources  1/16/2025 1048 by Rosa Echols RN  Outcome: Adequate for Discharge  1/16/2025 1047 by Rosa Echols RN  Outcome: Progressing  1/15/2025 2056 by Divya Doan RN  Outcome: Progressing     Problem: Pain  Goal: Verbalizes/displays adequate comfort level or baseline comfort level  1/16/2025 1048 by Rosa Echols RN  Outcome: Adequate for Discharge  1/16/2025 1047 by Rosa Echols RN  Outcome: Progressing  1/15/2025 2056 by Divya Doan RN  Outcome: Progressing     Problem: Safety - Adult  Goal: Free from fall injury  1/16/2025 1048 by Rosa Echols RN  Outcome: Adequate for Discharge  1/16/2025 1047 by Rosa Echols RN  Outcome: Progressing  1/15/2025 2056 by Divya Doan RN  Outcome: Progressing     Problem: Respiratory - Adult  Goal: Achieves optimal ventilation and oxygenation  1/16/2025 1048 by Rosa Echols RN  Outcome: Adequate for Discharge  1/16/2025 1047 by Rosa Echols RN  Outcome: Progressing  1/15/2025 2056 by Divya Doan RN  Outcome: Progressing     Problem: Hematologic - Adult  Goal: Maintains hematologic stability  1/16/2025 1048 by Rosa Echols RN  Outcome: Adequate for Discharge  1/16/2025 1047 by Rosa Echols RN  Outcome: Progressing  1/15/2025 2056 by Divya Doan RN  Outcome: Progressing     Problem: Cardiovascular - Adult  Goal: Maintains optimal cardiac output and hemodynamic stability  1/16/2025 1048 by Rosa Echols RN  Outcome: Adequate for Discharge  1/16/2025 1047 by Rosa Echols RN  Outcome: Progressing  Goal: Absence of cardiac dysrhythmias or at baseline  1/16/2025 1048 by Rosa Echols RN  Outcome: Adequate for Discharge  1/16/2025 1047 by Rosa Echols RN  Outcome: Progressing     Problem: Musculoskeletal - Adult  Goal: Return mobility to safest level of function  1/16/2025 1048 by Rosa Echols RN  Outcome: Adequate for

## 2025-01-16 NOTE — CONSULTS
Social work consult noted for home bipap. Per Keila Andrade, patient does not currently qualify for a bipap based on the overnight pulse ox studies done during this hospitalization. Patient will need to follow up as OP with his pulmonologist. Patient and bedside RN made aware. Secure message sent to attending to inform as well.    Electronically signed by MICHOACANO JARVIS RN on 1/16/2025 at 2:04 PM

## 2025-01-16 NOTE — PROGRESS NOTES
Pulmonary Critical Care Progress Note     Patient's name:  Serg Burnette  Medical Record Number: [id number]  Patient's account/billing number: [id number]  Patient's YOB: 1965  Age: 59 y.o.  Date of Admission: 1/9/2025  7:41 PM  Date of Consult: 1/16/2025      Primary Care Physician: Catherine Mims APRN - CNP      Code Status: Full Code    Chief complaint: Acute respiratory failure with hypercapnia    Assessment and Plan     Acute respiratory failure with hypercapnia  COPD with acute exacerbation secondary to influenza  Obesity    Plan:  Social service for home bipap  Titrate oxygen to keep sats 88 to 92%  Prednisone taper   DuoNeb and Symbicort  Discharge planning per primary. Ok from pulmonary stand point             Overnight:  Nocturnal pulse ox study on home oxygen reviewed.      REVIEW OF SYSTEMS:  Review of Systems -   General ROS: negative  Psychological ROS: negative  Ophthalmic ROS: negative  ENT ROS: negative  Allergy and Immunology ROS: negative  Hematological and Lymphatic ROS: negative  Endocrine ROS: negative  Breast ROS: negative  Respiratory ROS: Shortness of breath  Cardiovascular ROS: Severe shortness of breath no chest pain  Gastrointestinal ROS:negative  Genito-Urinary ROS: negative  Musculoskeletal ROS: negative  Neurological ROS: negative  Dermatological ROS: negative        Physical Exam:    Vitals: /82   Pulse 88   Temp 98.2 °F (36.8 °C) (Oral)   Resp 20   Ht 1.651 m (5' 5\")   Wt 102.3 kg (225 lb 8.5 oz)   SpO2 98%   BMI 37.53 kg/m²     Last Body weight:   Wt Readings from Last 3 Encounters:   01/15/25 102.3 kg (225 lb 8.5 oz)   10/07/24 104.5 kg (230 lb 6.1 oz)       Body Mass Index : Body mass index is 37.53 kg/m².      Intake and Output summary:   Intake/Output Summary (Last 24 hours) at 1/16/2025 0950  Last data filed at 1/16/2025 0903  Gross per 24 hour   Intake 965 ml   Output --   Net 965 ml       Physical Examination:     Gen: Moderate  distress  Eyes: PERRL. Anicteric sclera. No conjunctival injection.   ENT: No discharge. Posterior oropharynx clear. External appearance of ears and nose normal.  Neck: Trachea midline. No mass   Resp: Increased work of breathing diminished breath sounds severely bilaterally  CV: Regular rate. Regular rhythm. No murmur or rub.  Trace edema.   GI: Soft, Non-tender. Non-distended. +BS  Skin: Warm, dry, w/o erythema.   Lymph: No cervical or supraclavicular LAD.   M/S: No cyanosis. No clubbing.    Neuro:  CN 2-12 tested, no focal neurologic deficit.  Moves all extremities  Psych: Awake and alert, Oriented x 3.  Judgement and insight appropriate.  Mood stable.        Laboratory findings:-    CBC:   Recent Labs     01/16/25  0747   WBC 11.7*   HGB 13.8        BMP:    Recent Labs     01/14/25  0541 01/15/25  0602 01/16/25  0747    138 141   K 5.0 5.0 4.3   CL 96* 97* 99   CO2 34* 32 33*   BUN 22* 24* 22*   CREATININE 0.8* 0.8* 0.8*   GLUCOSE 109* 115* 118*     S. Calcium:  Recent Labs     01/16/25  0747   CALCIUM 9.3     No results for input(s): \"PHOS\" in the last 72 hours.        Radiology Review:  Pertinent images / reports were reviewed as a part of this visit.        Vladimir Rojas MD, M.D.            1/16/2025, 9:50 AM

## 2025-01-16 NOTE — DISCHARGE SUMMARY
V2.0  Discharge Summary    Name:  Serg Burnette /Age/Sex: 1965 (59 y.o. male)   Admit Date: 2025  Discharge Date: 25    MRN & CSN:  [id number] & [id number] Encounter Date and Time 25 10:35 AM EST    Attending:  Brody Ruiz DO Discharging Provider: Brody Ruiz DO       Hospital Course:     Brief HPI: Serg Burnette is a 59 y.o. male with a pertinent PMHx of COPD, HTN, HLD who presented with acute onset increased shortness of breath as well as cough and chills.  He was found to have an acute exacerbation of COPD and was admitted for further evaluation.  He was started on NIV with BiPAP and was treated with DuoNeb breathing treatments and systemic steroids.  He also completed a course of IV azithromycin.  During the admission he was also found to be influenza A positive and was treated with Tamiflu.  Pulmonology was consulted did and evaluated patient for possible home BiPAP however patient did not qualify at this time based off overnight pulse oximetry testing.  He is on a prednisone taper.  He is to follow-up with his Pulmonologist as an outpatient.    Brief Problem Based Course:     COPD with acute exacerbation  -Completed course of azithromycin  -Continue home bronchodilators with Symbicort and budesonide nebs  -Prednisone taper  -Home baseline 2 L supplemental oxygen via nasal cannula  -Outpatient follow-up with Pulmonology to evaluate for NIV    Influenza A  -Tested + on   -S/p Tamiflu    Essential hypertension  -Continue amlodipine and losartan    Hypothyroidism  -Synthroid    The patient expressed appropriate understanding of, and agreement with the discharge recommendations, medications, and plan.     Consults this admission:  IP CONSULT TO PULMONOLOGY  IP CONSULT TO SOCIAL WORK    Discharge Diagnosis:   COPD exacerbation (HCC)  Influenza A    Discharge Instruction:   Follow up appointments: PCP and pulmonology  Primary care physician: Catherine Mims APRN - BRENDA within 1

## 2025-01-16 NOTE — PLAN OF CARE
Problem: Discharge Planning  Goal: Discharge to home or other facility with appropriate resources  1/15/2025 2056 by Divya Doan RN  Outcome: Progressing  1/15/2025 0759 by Catalina Meyer RN  Outcome: Progressing     Problem: Pain  Goal: Verbalizes/displays adequate comfort level or baseline comfort level  1/15/2025 2056 by Divya Doan RN  Outcome: Progressing  1/15/2025 0759 by Catalina Meyer RN  Outcome: Progressing     Problem: Safety - Adult  Goal: Free from fall injury  1/15/2025 2056 by Divya Doan RN  Outcome: Progressing  1/15/2025 0759 by Catalina Meyer RN  Outcome: Progressing     Problem: Respiratory - Adult  Goal: Achieves optimal ventilation and oxygenation  1/15/2025 2056 by Divya Doan RN  Outcome: Progressing  1/15/2025 0759 by Catalina Meyer RN  Outcome: Progressing     Problem: Hematologic - Adult  Goal: Maintains hematologic stability  1/15/2025 2056 by Divya Doan RN  Outcome: Progressing  1/15/2025 0759 by Catalina Meyer RN  Outcome: Progressing

## 2025-01-16 NOTE — CARE COORDINATION
CASE MANAGEMENT DISCHARGE SUMMARY:    DISCHARGE DATE: 1/16/2025    DISCHARGED TO: home     TRANSPORTATION: via family             TIME: TBD by patient and bedside RN    COMMENTS: Patient, bedside RN, and Lupe aware of discharge plan and timeline.    Left message with Lupe re: bipap as OP. Awaiting return call at this time on status.    Electronically signed by MICHOACANO JARVIS RN on 1/16/2025 at 12:10 PM      Per Keila with Lupe, patient does not currently qualify for a bipap based on the overnight pulse ox studies done during this hospitalization. Patient will need to follow up as OP with his pulmonologist. Patient and bedside RN made aware.    Electronically signed by MICHOACANO JARVIS RN on 1/16/2025 at 12:41 PM    Discharge order removal noted. CM to continue to follow for updates.    Electronically signed by MICHOACANO JARVIS RN on 1/16/2025 at 3:37 PM

## 2025-01-16 NOTE — PROGRESS NOTES
PEDRO Magdaleno called stating patient requested a breathing treatment. Treatments are scheduled 3 times per day. I arrived immediately and started the treatment. The patient was in no apparent distress on 2L @ 100%, no labored breathing, no accessory muscle use, had diminished breath sounds throughout, and was speaking in complete sentences with ease. At this time he also received his steroid inhaler and rinsed afterwards. I turned the patient's oxygen down to 1L. When asked if the patient or his wife needed anything else at this time they declined. Will reassess Mr Burnette at his next scheduled treatment at 2pm.

## 2025-01-16 NOTE — PLAN OF CARE
Problem: Discharge Planning  Goal: Discharge to home or other facility with appropriate resources  1/16/2025 1047 by Roas Echols RN  Outcome: Progressing  1/15/2025 2056 by Divya Doan RN  Outcome: Progressing     Problem: Pain  Goal: Verbalizes/displays adequate comfort level or baseline comfort level  1/16/2025 1047 by Rosa Echols RN  Outcome: Progressing  1/15/2025 2056 by Divya Doan RN  Outcome: Progressing     Problem: Safety - Adult  Goal: Free from fall injury  1/16/2025 1047 by Rosa Echols RN  Outcome: Progressing  1/15/2025 2056 by Divya Doan RN  Outcome: Progressing     Problem: Respiratory - Adult  Goal: Achieves optimal ventilation and oxygenation  1/16/2025 1047 by Rosa Echols RN  Outcome: Progressing  1/15/2025 2056 by Divya Doan RN  Outcome: Progressing     Problem: Hematologic - Adult  Goal: Maintains hematologic stability  1/16/2025 1047 by Rosa Echols RN  Outcome: Progressing  1/15/2025 2056 by Divya Doan RN  Outcome: Progressing     Problem: Cardiovascular - Adult  Goal: Maintains optimal cardiac output and hemodynamic stability  Outcome: Progressing  Goal: Absence of cardiac dysrhythmias or at baseline  Outcome: Progressing     Problem: Musculoskeletal - Adult  Goal: Return mobility to safest level of function  Outcome: Progressing  Goal: Return ADL status to a safe level of function  Outcome: Progressing     Problem: Gastrointestinal - Adult  Goal: Minimal or absence of nausea and vomiting  Outcome: Progressing  Goal: Maintains or returns to baseline bowel function  Outcome: Progressing  Goal: Maintains adequate nutritional intake  Outcome: Progressing     Problem: Infection - Adult  Goal: Absence of infection at discharge  Outcome: Progressing  Goal: Absence of infection during hospitalization  Outcome: Progressing

## 2025-01-16 NOTE — PROGRESS NOTES
Pt declined 3 AM vitals and weight. Electronically signed by Divya Doan RN on 1/16/25 at 3:03 AM EST

## 2025-01-16 NOTE — PROGRESS NOTES
CLINICAL PHARMACY NOTE: MEDS TO BEDS    Total # of Prescriptions Filled: 2   The following medications were delivered to the patient:  Current Discharge Medication List        START taking these medications    Details   predniSONE (DELTASONE) 10 MG tablet Take 4 tablets by mouth daily for 3 days, THEN 3 tablets daily for 3 days, THEN 2 tablets daily for 3 days, THEN 1 tablet daily for 3 days.  Qty: 30 tablet, Refills: 0      guaiFENesin (MUCINEX) 600 MG extended release tablet Take 1 tablet by mouth 2 times daily  Qty: 12 tablet, Refills: 0               Additional Documentation: Tubed to Rosa

## 2025-01-16 NOTE — PROGRESS NOTES
This RN notified lab to retime patient's AM labs for 1/16 to 8 AM per patient's request. Electronically signed by Divya Doan RN on 1/15/25 at 8:13 PM EST

## 2025-01-16 NOTE — PROGRESS NOTES
Patient started on nocturnal sleep study.  Study ordered on 1L nasal cannula.         01/15/25 2153   Oxygen Therapy/Pulse Ox   O2 Therapy Oxygen   O2 Device Nasal cannula   O2 Flow Rate (L/min) 1 L/min   Pulse 88   Respirations 21   SpO2 92 %   Pulse Oximeter Device Mode Continuous   Pulse Oximeter Device Location Finger

## 2025-01-16 NOTE — PROGRESS NOTES
RT notified that pt's O2 saturation was at 85% on 1 L of oxygen, pt currently undergoing sleep study. RT instructed to turn O2 up to 2 L, O2 increased at this time. Pt is now 93% on 2 L. Electronically signed by Divya Doan RN on 1/16/25 at 12:30 AM EST      Pt declined 3 AM vitals and weight. Electronically signed by Divya Doan RN on 1/16/25 at 3:03 AM EST

## 2025-01-17 VITALS
HEART RATE: 107 BPM | HEIGHT: 65 IN | OXYGEN SATURATION: 100 % | TEMPERATURE: 97.4 F | SYSTOLIC BLOOD PRESSURE: 136 MMHG | BODY MASS INDEX: 37.58 KG/M2 | RESPIRATION RATE: 24 BRPM | WEIGHT: 225.53 LBS | DIASTOLIC BLOOD PRESSURE: 98 MMHG

## 2025-01-17 LAB
ALBUMIN SERPL-MCNC: 3.8 G/DL (ref 3.4–5)
ALBUMIN/GLOB SERPL: 1.5 {RATIO} (ref 1.1–2.2)
ALP SERPL-CCNC: 64 U/L (ref 40–129)
ALT SERPL-CCNC: 19 U/L (ref 10–40)
ANION GAP SERPL CALCULATED.3IONS-SCNC: 8 MMOL/L (ref 3–16)
AST SERPL-CCNC: 16 U/L (ref 15–37)
BILIRUB SERPL-MCNC: 0.5 MG/DL (ref 0–1)
BUN SERPL-MCNC: 27 MG/DL (ref 7–20)
CALCIUM SERPL-MCNC: 8.8 MG/DL (ref 8.3–10.6)
CHLORIDE SERPL-SCNC: 100 MMOL/L (ref 99–110)
CO2 SERPL-SCNC: 33 MMOL/L (ref 21–32)
CREAT SERPL-MCNC: 0.8 MG/DL (ref 0.9–1.3)
DEPRECATED RDW RBC AUTO: 15.7 % (ref 12.4–15.4)
GFR SERPLBLD CREATININE-BSD FMLA CKD-EPI: >90 ML/MIN/{1.73_M2}
GLUCOSE SERPL-MCNC: 82 MG/DL (ref 70–99)
HCT VFR BLD AUTO: 42.9 % (ref 40.5–52.5)
HGB BLD-MCNC: 13.8 G/DL (ref 13.5–17.5)
MCH RBC QN AUTO: 27 PG (ref 26–34)
MCHC RBC AUTO-ENTMCNC: 32.2 G/DL (ref 31–36)
MCV RBC AUTO: 83.6 FL (ref 80–100)
PLATELET # BLD AUTO: 348 K/UL (ref 135–450)
PMV BLD AUTO: 9.1 FL (ref 5–10.5)
POTASSIUM SERPL-SCNC: 3.9 MMOL/L (ref 3.5–5.1)
PROT SERPL-MCNC: 6.3 G/DL (ref 6.4–8.2)
RBC # BLD AUTO: 5.12 M/UL (ref 4.2–5.9)
SODIUM SERPL-SCNC: 141 MMOL/L (ref 136–145)
WBC # BLD AUTO: 14.5 K/UL (ref 4–11)

## 2025-01-17 PROCEDURE — 94660 CPAP INITIATION&MGMT: CPT

## 2025-01-17 PROCEDURE — 36415 COLL VENOUS BLD VENIPUNCTURE: CPT

## 2025-01-17 PROCEDURE — 6360000002 HC RX W HCPCS: Performed by: INTERNAL MEDICINE

## 2025-01-17 PROCEDURE — 94640 AIRWAY INHALATION TREATMENT: CPT

## 2025-01-17 PROCEDURE — 6370000000 HC RX 637 (ALT 250 FOR IP): Performed by: STUDENT IN AN ORGANIZED HEALTH CARE EDUCATION/TRAINING PROGRAM

## 2025-01-17 PROCEDURE — 80053 COMPREHEN METABOLIC PANEL: CPT

## 2025-01-17 PROCEDURE — 6370000000 HC RX 637 (ALT 250 FOR IP): Performed by: FAMILY MEDICINE

## 2025-01-17 PROCEDURE — 94761 N-INVAS EAR/PLS OXIMETRY MLT: CPT

## 2025-01-17 PROCEDURE — 99233 SBSQ HOSP IP/OBS HIGH 50: CPT | Performed by: INTERNAL MEDICINE

## 2025-01-17 PROCEDURE — 85027 COMPLETE CBC AUTOMATED: CPT

## 2025-01-17 PROCEDURE — 6370000000 HC RX 637 (ALT 250 FOR IP): Performed by: INTERNAL MEDICINE

## 2025-01-17 PROCEDURE — 2700000000 HC OXYGEN THERAPY PER DAY

## 2025-01-17 RX ADMIN — LEVOTHYROXINE SODIUM 175 MCG: 0.12 TABLET ORAL at 08:14

## 2025-01-17 RX ADMIN — ASPIRIN 81 MG: 81 TABLET, COATED ORAL at 08:14

## 2025-01-17 RX ADMIN — AMLODIPINE BESYLATE 10 MG: 10 TABLET ORAL at 08:14

## 2025-01-17 RX ADMIN — GUAIFENESIN 600 MG: 600 TABLET ORAL at 08:13

## 2025-01-17 RX ADMIN — PANTOPRAZOLE SODIUM 40 MG: 40 TABLET, DELAYED RELEASE ORAL at 08:19

## 2025-01-17 RX ADMIN — PREDNISONE 40 MG: 20 TABLET ORAL at 08:13

## 2025-01-17 RX ADMIN — LOSARTAN POTASSIUM 25 MG: 25 TABLET, FILM COATED ORAL at 08:14

## 2025-01-17 RX ADMIN — ESCITALOPRAM OXALATE 10 MG: 10 TABLET ORAL at 08:14

## 2025-01-17 RX ADMIN — Medication 2 PUFF: at 08:35

## 2025-01-17 RX ADMIN — HYDROXYZINE HYDROCHLORIDE 10 MG: 10 TABLET ORAL at 14:20

## 2025-01-17 RX ADMIN — ENOXAPARIN SODIUM 30 MG: 100 INJECTION SUBCUTANEOUS at 08:15

## 2025-01-17 RX ADMIN — CETIRIZINE HYDROCHLORIDE 10 MG: 10 TABLET, FILM COATED ORAL at 08:15

## 2025-01-17 ASSESSMENT — PAIN SCALES - GENERAL: PAINLEVEL_OUTOF10: 0

## 2025-01-17 NOTE — PROGRESS NOTES
Pulmonary Critical Care Progress Note     Patient's name:  Serg Burnette  Medical Record Number: [id number]  Patient's account/billing number: [id number]  Patient's YOB: 1965  Age: 59 y.o.  Date of Admission: 1/9/2025  7:41 PM  Date of Consult: 1/17/2025      Primary Care Physician: Catherine Mims APRN - CNP      Code Status: Full Code    Chief complaint: Acute respiratory failure with hypercapnia    Assessment and Plan     Acute respiratory failure with hypercapnia  COPD with acute exacerbation secondary to influenza  Obesity    Plan:  Social service for home bipap  Titrate oxygen to keep sats 88 to 92%  Prednisone taper   DuoNeb and Symbicort  Discharge planning per primary.   Will sign off please call with questions             Overnight:  Nocturnal pulse ox study on home oxygen reviewed.      REVIEW OF SYSTEMS:  Review of Systems -   General ROS: negative  Psychological ROS: negative  Ophthalmic ROS: negative  ENT ROS: negative  Allergy and Immunology ROS: negative  Hematological and Lymphatic ROS: negative  Endocrine ROS: negative  Breast ROS: negative  Respiratory ROS: Shortness of breath  Cardiovascular ROS: Severe shortness of breath no chest pain  Gastrointestinal ROS:negative  Genito-Urinary ROS: negative  Musculoskeletal ROS: negative  Neurological ROS: negative  Dermatological ROS: negative        Physical Exam:    Vitals: /67   Pulse (!) 104   Temp 98 °F (36.7 °C) (Oral)   Resp 18   Ht 1.651 m (5' 5\")   Wt 102.3 kg (225 lb 8.5 oz)   SpO2 100%   BMI 37.53 kg/m²     Last Body weight:   Wt Readings from Last 3 Encounters:   01/15/25 102.3 kg (225 lb 8.5 oz)   10/07/24 104.5 kg (230 lb 6.1 oz)       Body Mass Index : Body mass index is 37.53 kg/m².      Intake and Output summary:   Intake/Output Summary (Last 24 hours) at 1/17/2025 0927  Last data filed at 1/17/2025 0535  Gross per 24 hour   Intake 240 ml   Output 350 ml   Net -110 ml       Physical Examination:

## 2025-01-17 NOTE — DISCHARGE SUMMARY
V2.0  Discharge Summary    Name:  Serg Burnette /Age/Sex: 1965 (59 y.o. male)   Admit Date: 2025  Discharge Date: 25    MRN & CSN:  [id number] & [id number] Encounter Date and Time 25 11:56 AM EST    Attending:  Brody Ruiz DO Discharging Provider: Brody Ruiz DO       Hospital Course:     Brief HPI: Serg Burnette is a 59 y.o. male with a pertinent PMHx of COPD, HTN, HLD who presented with acute onset increased shortness of breath as well as cough and chills.  He was found to have an acute exacerbation of COPD and was admitted for further evaluation.  He was started on NIV with BiPAP he was treated with DuoNeb breathing treatments and systemic steroids.  He also completed a course of IV azithromycin.  During the admission he was also found to be influenza A positive and was treated with Tamiflu.  Pulmonology was consulted and evaluated the patient for possible home BiPAP however patient did not qualify at this time based off of overnight pulse oximetry testing.  He is not currently on a prednisone taper.  He is to follow-up with pulmonology as an outpatient.    Brief Problem Based Course:     COPD with acute exacerbation  -Completed course of azithromycin  -Continue home bronchodilators with Symbicort  -Prednisone taper  -Continue home supplemental oxygen and baseline of 2 L  -Outpatient follow-up with Pulmonology to evaluate for NIV    Influenza A  -Tested positive on   -S/p Tamiflu    Essential hypertension  -Continue amlodipine and losartan    Hypothyroidism  -Continue Synthroid      The patient expressed appropriate understanding of, and agreement with the discharge recommendations, medications, and plan.     Consults this admission:  IP CONSULT TO PULMONOLOGY  IP CONSULT TO SOCIAL WORK    Discharge Diagnosis:   COPD exacerbation (HCC)  Influenza A    Discharge Instruction:   Follow up appointments: PCP and pulmonology  Primary care physician: Catherine Mims, JOSE MIGUEL - CNP  to Get Your Medications        These medications were sent to [facility] - [place], [place] - [address] - P [phone] - F [phone]  [address] [address]      Phone: [phone]   albuterol sulfate  (90 Base) MCG/ACT inhaler  budesonide 0.25 MG/2ML nebulizer suspension  guaiFENesin 600 MG extended release tablet  predniSONE 10 MG tablet  Symbicort 160-4.5 MCG/ACT Aero        Objective Findings at Discharge:   BP (!) 136/98   Pulse (!) 107   Temp 97.4 °F (36.3 °C) (Oral)   Resp 24   Ht 1.651 m (5' 5\")   Wt 102.3 kg (225 lb 8.5 oz)   SpO2 100%   BMI 37.53 kg/m²       Physical Exam:   Physical Exam  Constitutional:       General: He is not in acute distress.  Cardiovascular:      Rate and Rhythm: Normal rate and regular rhythm.      Heart sounds: No murmur heard.  Pulmonary:      Effort: Pulmonary effort is normal.      Breath sounds: No wheezing, rhonchi or rales.   Abdominal:      General: There is no distension.      Palpations: Abdomen is soft.      Tenderness: There is no abdominal tenderness.   Musculoskeletal:      Right lower leg: No edema.      Left lower leg: No edema.   Neurological:      Mental Status: He is alert.              Labs and Imaging   No results found.    CBC:   Recent Labs     01/15/25  0602 01/16/25  0747 01/17/25  0523   WBC 8.8 11.7* 14.5*   HGB 13.4* 13.8 13.8    345 348     BMP:    Recent Labs     01/15/25  0602 01/16/25  0747 01/17/25  0523    141 141   K 5.0 4.3 3.9   CL 97* 99 100   CO2 32 33* 33*   BUN 24* 22* 27*   CREATININE 0.8* 0.8* 0.8*   GLUCOSE 115* 118* 82     Hepatic:   Recent Labs     01/15/25  0602 01/16/25  0747 01/17/25  0523   AST 24 21 16   ALT 16 15 19   BILITOT 0.6 0.7 0.5   ALKPHOS 68 70 64     Lipids: No results found for: \"CHOL\", \"HDL\", \"TRIG\"  Hemoglobin A1C: No results found for: \"LABA1C\"  TSH: No results found for: \"TSH\"  Troponin: No results found for: \"TROPONINT\"  Lactic

## 2025-01-17 NOTE — PROGRESS NOTES
Discharge orders acknowledged by RN . Discharge teaching completed with pt and family. AVS reviewed and all questions answered. Medication regimen reviewed and pt understands schedule. MedsToBeds received. Follow up appointments also reviewed with pt and resources given for discharge. IV removed. Bedside monitor removed from pt. Pt vitals WNL. Pt discharged with all belongings to home with wife. Pt transported off of unit via wheelchair. No complications.

## 2025-01-17 NOTE — CARE COORDINATION
CASE MANAGEMENT DISCHARGE SUMMARY:    DISCHARGE DATE: 1/17/2025    DISCHARGED TO: home     TRANSPORTATION: via family             TIME: TBD by patient and bedside RN    COMMENTS: Patient and bedside RN aware of discharge plan and timeline.    Electronically signed by MICHOACANO JARVIS RN on 1/17/2025 at 12:29 PM      Per respiratory therapy, patient refused to engage in the home oxygen evaluation earlier today. RT to re-attempt at next visit with patient. CM to continue to follow for any possible changes to home oxygen needs.    Electronically signed by MICHOACANO JARVIS RN on 1/17/2025 at 1:45 PM

## 2025-01-28 NOTE — PROGRESS NOTES
Physician Progress Note      PATIENT:               YO HUSAIN  CSN #:                  [id number]  :                       1965  ADMIT DATE:       2025 7:41 PM  DISCH DATE:        2025 3:48 PM  RESPONDING  PROVIDER #:        Brody Ruiz DO          QUERY TEXT:    Pt admitted with SOB. Pt noted to have FLU A. If possible, please document in   progress notes and discharge summary the present on admission status of ***:    The medical record reflects the following:  Risk Factors: FLU A, COPD w/ AE  Clinical Indicators: ED: Tested negative for flu on the  but symptomatic   with SOB, cough and chills. States he has been sweaty throughout the day.   chest pressure when he breathes in and out, Very diminished breath sounds   bilaterally, tachypnea present, retractions present, speaking in short 1-2   word phrases, expiratory wheezing bilaterally, , patient was tolerating   his normal 2 L on arrival.  Patient had very minimal air movement on arrival   and was in respiratory distress tripoding and sitting on the edge of the bed.    Patient was trialed on aerosols but continued to    Treatment: Tamiflu, 125 mg Solu-Medrol, BiPAP, Pulm consult  Options provided:  -- Yes, FLU A was present at the time of the order to admit to the hospital  -- No, FLU A was not present on admission and developed during the inpatient   stay  -- Other - I will add my own diagnosis  -- Disagree - Not applicable / Not valid  -- Disagree - Clinically unable to determine / Unknown  -- Refer to Clinical Documentation Reviewer    PROVIDER RESPONSE TEXT:    No,FLU A was not present on admission and developed during the inpatient stay.    Query created by: Kalyn Young on 2025 4:12 PM      Electronically signed by:  Brody Ruiz DO 2025 6:07 PM

## 2025-02-25 ENCOUNTER — OFFICE VISIT (OUTPATIENT)
Dept: PULMONOLOGY | Age: 60
End: 2025-02-25
Payer: MEDICARE

## 2025-02-25 VITALS
HEIGHT: 66 IN | BODY MASS INDEX: 38.15 KG/M2 | HEART RATE: 102 BPM | SYSTOLIC BLOOD PRESSURE: 128 MMHG | RESPIRATION RATE: 18 BRPM | WEIGHT: 237.4 LBS | TEMPERATURE: 97.6 F | DIASTOLIC BLOOD PRESSURE: 76 MMHG | OXYGEN SATURATION: 89 %

## 2025-02-25 DIAGNOSIS — J96.12 CHRONIC RESPIRATORY FAILURE WITH HYPOXIA AND HYPERCAPNIA (HCC): ICD-10-CM

## 2025-02-25 DIAGNOSIS — J96.11 CHRONIC RESPIRATORY FAILURE WITH HYPOXIA AND HYPERCAPNIA (HCC): ICD-10-CM

## 2025-02-25 DIAGNOSIS — J44.9 STAGE 4 VERY SEVERE COPD BY GOLD CLASSIFICATION (HCC): Primary | ICD-10-CM

## 2025-02-25 PROCEDURE — 3023F SPIROM DOC REV: CPT | Performed by: INTERNAL MEDICINE

## 2025-02-25 PROCEDURE — 3017F COLORECTAL CA SCREEN DOC REV: CPT | Performed by: INTERNAL MEDICINE

## 2025-02-25 PROCEDURE — 99214 OFFICE O/P EST MOD 30 MIN: CPT | Performed by: INTERNAL MEDICINE

## 2025-02-25 PROCEDURE — 3078F DIAST BP <80 MM HG: CPT | Performed by: INTERNAL MEDICINE

## 2025-02-25 PROCEDURE — 1036F TOBACCO NON-USER: CPT | Performed by: INTERNAL MEDICINE

## 2025-02-25 PROCEDURE — G8427 DOCREV CUR MEDS BY ELIG CLIN: HCPCS | Performed by: INTERNAL MEDICINE

## 2025-02-25 PROCEDURE — G2211 COMPLEX E/M VISIT ADD ON: HCPCS | Performed by: INTERNAL MEDICINE

## 2025-02-25 PROCEDURE — G8417 CALC BMI ABV UP PARAM F/U: HCPCS | Performed by: INTERNAL MEDICINE

## 2025-02-25 PROCEDURE — 3074F SYST BP LT 130 MM HG: CPT | Performed by: INTERNAL MEDICINE

## 2025-02-25 RX ORDER — ALBUTEROL SULFATE 90 UG/1
2 INHALANT RESPIRATORY (INHALATION) EVERY 6 HOURS PRN
Qty: 18 G | Refills: 5 | Status: SHIPPED | OUTPATIENT
Start: 2025-02-25

## 2025-02-25 RX ORDER — BUDESONIDE AND FORMOTEROL FUMARATE DIHYDRATE 160; 4.5 UG/1; UG/1
2 AEROSOL RESPIRATORY (INHALATION) 2 TIMES DAILY
Qty: 10.2 G | Refills: 5 | Status: SHIPPED | OUTPATIENT
Start: 2025-02-25

## 2025-02-25 RX ORDER — ACLIDINIUM BROMIDE 400 UG/1
1 POWDER, METERED RESPIRATORY (INHALATION)
Qty: 1 EACH | Refills: 5 | Status: SHIPPED | OUTPATIENT
Start: 2025-02-25

## 2025-02-25 NOTE — PROGRESS NOTES
Pulmonary progress note           REASON FOR CONSULTATION:  Chief Complaint   Patient presents with    New Patient    COPD        Consult at request of Catherine Mims APRN - CNP     PCP: Catherine Mims APRN - CNP        Assessment and Plan:   Diagnosis Orders   1. Stage 4 very severe COPD by GOLD classification ([facility])        2. Chronic respiratory failure with hypoxia and hypercapnia ([facility])            Plan:  Continue symbicort and Tudorza  Albuterol as needed  Will lower bipap settings to 12/6.           HISTORY OF PRESENT ILLNESS: Serg Burnette is very pleasant 59 y.o. year old gentleman with medical history stated below significant for very severe copd, chronic hypoxic and hypercapnic failure here for follow up post hospitalization for exacerbation secondary to influenza   On continuous O2 @ 3 LPM  Started on bipap but has not been able to use on regular bases, due to high pressure        Past Medical History:   Diagnosis Date    COPD (chronic obstructive pulmonary disease) ([facility])     Hyperlipidemia     Hypertension        No past surgical history on file.    family history is not on file.      SOCIAL HISTORY:   reports that he has quit smoking. He has been exposed to tobacco smoke. He does not have any smokeless tobacco history on file.      ALLERGIES:  Patient is allergic to dye [barium-containing compounds].    REVIEW OF SYSTEMS:  Constitutional: Negative for fever, no wt loss, no night sweats   HENT: Negative for sore throat, difficulty swallowing,   Eyes: Negative for redness, no discharge   Respiratory: sob, cough, sputum   Cardiovascular: Negative for chest pain, no palpitations   Gastrointestinal: Negative for vomiting, diarrhea   Genitourinary: Negative for hematuria, no dysuria    Musculoskeletal: Negative for arthralgias, no joint swelling   Skin: Negative for rash  LE: no edema   Neurological: Negative

## 2025-03-06 ENCOUNTER — TELEPHONE (OUTPATIENT)
Dept: PULMONOLOGY | Age: 60
End: 2025-03-06

## 2025-03-06 NOTE — TELEPHONE ENCOUNTER
[facility] called and stated patient was told by Dr. Osei that he would change settings on Bipap to change settings to lower the pressure. Auto pap might make it more tolerable. Please change settings and send to [facility].   Send fax to ATTN: Andreina at [phone]

## 2025-03-17 RX ORDER — ALBUTEROL SULFATE 90 UG/1
2 INHALANT RESPIRATORY (INHALATION) EVERY 4 HOURS PRN
Qty: 18 G | Refills: 5 | Status: SHIPPED | OUTPATIENT
Start: 2025-03-17

## 2025-04-02 ENCOUNTER — TELEPHONE (OUTPATIENT)
Dept: PULMONOLOGY | Age: 60
End: 2025-04-02

## 2025-04-02 NOTE — TELEPHONE ENCOUNTER
Submitted PA for Fiona Warren 400MCG/ACT aerosol powder   Via CMM Key: [id number]  STATUS: PENDING.    Please reach out to patient for NEW PRESCRIPTION coverage.     Thank you      Follow up done daily; if no decision with in three days we will refax.  If another three days goes by with no decision will call the insurance for status.

## 2025-04-02 NOTE — TELEPHONE ENCOUNTER
Spoke to Patient Stated he is using his albuterol inhaler every 4hrs for wheezing he should not be using it every 4hrs it is only as needed and the symbicort twice a day schedule him a sooner appt to see Dr. Osei    and sent request to PA dept for the Tudorza inhaler.

## 2025-04-02 NOTE — TELEPHONE ENCOUNTER
Received fax from [facility] needed for Tudorza Pres Aer 400/act     KEY:  None  Patient name: Serg Burnette  : 1965       Sent to PA Pool

## 2025-04-02 NOTE — TELEPHONE ENCOUNTER
Pt called and said there is a prior auth needed for the TUDORZA and the albuterol sulfate and that it is only lasting him 16 days     Pharmacy: [facility] on [address]

## 2025-04-22 ENCOUNTER — OFFICE VISIT (OUTPATIENT)
Dept: PULMONOLOGY | Age: 60
End: 2025-04-22
Payer: MEDICARE

## 2025-04-22 VITALS
SYSTOLIC BLOOD PRESSURE: 126 MMHG | RESPIRATION RATE: 18 BRPM | TEMPERATURE: 98.3 F | OXYGEN SATURATION: 97 % | WEIGHT: 255 LBS | HEART RATE: 93 BPM | BODY MASS INDEX: 40.98 KG/M2 | HEIGHT: 66 IN | DIASTOLIC BLOOD PRESSURE: 74 MMHG

## 2025-04-22 DIAGNOSIS — J96.11 CHRONIC RESPIRATORY FAILURE WITH HYPOXIA AND HYPERCAPNIA (HCC): ICD-10-CM

## 2025-04-22 DIAGNOSIS — J96.12 CHRONIC RESPIRATORY FAILURE WITH HYPOXIA AND HYPERCAPNIA (HCC): ICD-10-CM

## 2025-04-22 DIAGNOSIS — E66.01 MORBID OBESITY (HCC): ICD-10-CM

## 2025-04-22 DIAGNOSIS — J44.9 STAGE 4 VERY SEVERE COPD BY GOLD CLASSIFICATION (HCC): Primary | ICD-10-CM

## 2025-04-22 PROCEDURE — G8417 CALC BMI ABV UP PARAM F/U: HCPCS | Performed by: INTERNAL MEDICINE

## 2025-04-22 PROCEDURE — G8427 DOCREV CUR MEDS BY ELIG CLIN: HCPCS | Performed by: INTERNAL MEDICINE

## 2025-04-22 PROCEDURE — 3074F SYST BP LT 130 MM HG: CPT | Performed by: INTERNAL MEDICINE

## 2025-04-22 PROCEDURE — 1036F TOBACCO NON-USER: CPT | Performed by: INTERNAL MEDICINE

## 2025-04-22 PROCEDURE — 99214 OFFICE O/P EST MOD 30 MIN: CPT | Performed by: INTERNAL MEDICINE

## 2025-04-22 PROCEDURE — 3023F SPIROM DOC REV: CPT | Performed by: INTERNAL MEDICINE

## 2025-04-22 PROCEDURE — G2211 COMPLEX E/M VISIT ADD ON: HCPCS | Performed by: INTERNAL MEDICINE

## 2025-04-22 PROCEDURE — 3017F COLORECTAL CA SCREEN DOC REV: CPT | Performed by: INTERNAL MEDICINE

## 2025-04-22 PROCEDURE — 3078F DIAST BP <80 MM HG: CPT | Performed by: INTERNAL MEDICINE

## 2025-04-22 RX ORDER — FUROSEMIDE 40 MG/1
120 TABLET ORAL DAILY
Qty: 10 TABLET | Refills: 0 | Status: SHIPPED | OUTPATIENT
Start: 2025-04-22 | End: 2026-04-22

## 2025-04-22 RX ORDER — AZITHROMYCIN 250 MG/1
250 TABLET, FILM COATED ORAL
Qty: 39 TABLET | Refills: 5 | Status: SHIPPED | OUTPATIENT
Start: 2025-04-23

## 2025-04-22 NOTE — PROGRESS NOTES
in the morning and 1 puff in the evening. 1 each 5    guaiFENesin (MUCINEX) 600 MG extended release tablet Take 1 tablet by mouth 2 times daily 12 tablet 0    budesonide (PULMICORT) 0.25 MG/2ML nebulizer suspension Take 2 mLs by nebulization in the morning and 2 mLs in the evening. 60 each 0    hydrOXYzine HCl (ATARAX) 10 MG tablet Take 1 tablet by mouth 3 times daily as needed      amLODIPine (NORVASC) 10 MG tablet Take 1 tablet by mouth daily      aspirin 81 MG EC tablet Take 1 tablet by mouth daily      cetirizine (ZYRTEC) 10 MG tablet Take 1 tablet by mouth daily      cyclobenzaprine (FLEXERIL) 10 MG tablet Take 1 tablet by mouth 3 times daily as needed for Muscle spasms      escitalopram (LEXAPRO) 10 MG tablet Take 1 tablet by mouth daily      ibuprofen (ADVIL;MOTRIN) 800 MG tablet Take 1 tablet by mouth every 8 hours as needed for Pain or Fever      levothyroxine (SYNTHROID) 175 MCG tablet Take 1 tablet by mouth daily      losartan (COZAAR) 25 MG tablet Take 1 tablet by mouth daily      omeprazole (PRILOSEC) 20 MG delayed release capsule Take 2 capsules by mouth daily       No current facility-administered medications for this visit.       Data Reviewed:   CBC and Renal reviewed  Last CBC  Lab Results   Component Value Date/Time    WBC 14.5 01/17/2025 05:23 AM    RBC 5.12 01/17/2025 05:23 AM    HGB 13.8 01/17/2025 05:23 AM    MCV 83.6 01/17/2025 05:23 AM     01/17/2025 05:23 AM     Last Renal  Lab Results   Component Value Date/Time     01/17/2025 05:23 AM    K 3.9 01/17/2025 05:23 AM    K 4.7 01/10/2025 04:54 AM     01/17/2025 05:23 AM    CO2 33 01/17/2025 05:23 AM    CO2 33 01/16/2025 07:47 AM    CO2 32 01/15/2025 06:02 AM    BUN 27 01/17/2025 05:23 AM    CREATININE 0.8 01/17/2025 05:23 AM    GLUCOSE 82 01/17/2025 05:23 AM    CALCIUM 8.8 01/17/2025 05:23 AM       Last ABG  POC Blood Gas: No results found for: \"POCPH\", \"POCPCO2\", \"POCPO2\", \"POCHCO3\", \"NBEA\", \"UDRI2UCE\"  No results for

## 2025-04-22 NOTE — PATIENT INSTRUCTIONS
Medications sent  Will send a note to your primary physician regarding wt loss medications  3 months follow up

## 2025-05-07 ENCOUNTER — PATIENT MESSAGE (OUTPATIENT)
Dept: PULMONOLOGY | Age: 60
End: 2025-05-07

## 2025-05-07 ENCOUNTER — TELEPHONE (OUTPATIENT)
Dept: PULMONOLOGY | Age: 60
End: 2025-05-07

## 2025-05-07 NOTE — TELEPHONE ENCOUNTER
Submitted PA for Fiona Warren 400MCG/ACT aerosol powder   Via CMM Key: [id number]  STATUS: PENDING.    Follow up done daily; if no decision with in three days we will refax.  If another three days goes by with no decision will call the insurance for status.

## 2025-05-07 NOTE — TELEPHONE ENCOUNTER
Received fax from [employer] needed for Tudorza 400MCG     KEY:  none  Patient name: Serg Burnette  : 1965       Sent to PA Pool      Please use [facility] medicare  Policy number [id number]

## 2025-05-07 NOTE — TELEPHONE ENCOUNTER
Pa dept needed correct Insurance he had 3 different insurance policy on file  spoke to Pt   [facility] is his current Insurance resent PA request to PA dept with correct Insurance Information.

## 2025-05-08 ENCOUNTER — PATIENT MESSAGE (OUTPATIENT)
Dept: PULMONOLOGY | Age: 60
End: 2025-05-08

## 2025-05-08 NOTE — TELEPHONE ENCOUNTER
Patient states he called [employer], they said his Tudorza was denied yesterday afternoon. [facility] instructed him that we need to fax an appeal stating that he's tried [employer]  and [employer] and neither one of them work for him. He needs Tudorza or he's going to end up the ED and he doesn't' want that he says.  [facility] states to fax straight to the appeals department at [phone]

## 2025-05-12 NOTE — TELEPHONE ENCOUNTER
Thank you for reaching out, but unfortunately there is nothing that we can do with this request.    Since the PA team did not submit the original Appeal (and we do not have access to the information that was submitted) we cannot advise on any next steps.    Any follow up questions, or appeals follow up must be done at the clinic level

## 2025-05-21 ENCOUNTER — TELEPHONE (OUTPATIENT)
Dept: PULMONOLOGY | Age: 60
End: 2025-05-21

## 2025-05-21 DIAGNOSIS — J44.9 STAGE 4 VERY SEVERE COPD BY GOLD CLASSIFICATION (HCC): Primary | ICD-10-CM

## 2025-05-21 NOTE — TELEPHONE ENCOUNTER
Order faxed to amanda    The patient has been notified of this information and all questions answered.

## 2025-05-21 NOTE — TELEPHONE ENCOUNTER
Pt called said he needs a nebulizer an his pcp told him to reach out to his lung doctor. Pt would like the order sent to [facility]D# [phone] P# [phone]

## 2025-06-04 ENCOUNTER — PATIENT MESSAGE (OUTPATIENT)
Dept: PULMONOLOGY | Age: 60
End: 2025-06-04

## 2025-06-05 RX ORDER — ALBUTEROL SULFATE 0.83 MG/ML
2.5 SOLUTION RESPIRATORY (INHALATION) EVERY 6 HOURS PRN
Qty: 120 EACH | Refills: 11 | Status: SHIPPED | OUTPATIENT
Start: 2025-06-05

## 2025-07-01 ENCOUNTER — OFFICE VISIT (OUTPATIENT)
Dept: PULMONOLOGY | Age: 60
End: 2025-07-01
Payer: MEDICARE

## 2025-07-01 VITALS
DIASTOLIC BLOOD PRESSURE: 74 MMHG | HEIGHT: 66 IN | BODY MASS INDEX: 41.78 KG/M2 | TEMPERATURE: 98 F | RESPIRATION RATE: 18 BRPM | WEIGHT: 260 LBS | HEART RATE: 92 BPM | SYSTOLIC BLOOD PRESSURE: 126 MMHG | OXYGEN SATURATION: 98 %

## 2025-07-01 DIAGNOSIS — J44.9 STAGE 4 VERY SEVERE COPD BY GOLD CLASSIFICATION (HCC): ICD-10-CM

## 2025-07-01 DIAGNOSIS — E66.01 MORBID OBESITY (HCC): ICD-10-CM

## 2025-07-01 DIAGNOSIS — I27.81 CHRONIC COR PULMONALE (HCC): Primary | ICD-10-CM

## 2025-07-01 DIAGNOSIS — J96.11 CHRONIC RESPIRATORY FAILURE WITH HYPOXIA AND HYPERCAPNIA (HCC): ICD-10-CM

## 2025-07-01 DIAGNOSIS — J96.12 CHRONIC RESPIRATORY FAILURE WITH HYPOXIA AND HYPERCAPNIA (HCC): ICD-10-CM

## 2025-07-01 PROCEDURE — 1036F TOBACCO NON-USER: CPT | Performed by: INTERNAL MEDICINE

## 2025-07-01 PROCEDURE — 3078F DIAST BP <80 MM HG: CPT | Performed by: INTERNAL MEDICINE

## 2025-07-01 PROCEDURE — G2211 COMPLEX E/M VISIT ADD ON: HCPCS | Performed by: INTERNAL MEDICINE

## 2025-07-01 PROCEDURE — 99214 OFFICE O/P EST MOD 30 MIN: CPT | Performed by: INTERNAL MEDICINE

## 2025-07-01 PROCEDURE — G8417 CALC BMI ABV UP PARAM F/U: HCPCS | Performed by: INTERNAL MEDICINE

## 2025-07-01 PROCEDURE — 3023F SPIROM DOC REV: CPT | Performed by: INTERNAL MEDICINE

## 2025-07-01 PROCEDURE — G8427 DOCREV CUR MEDS BY ELIG CLIN: HCPCS | Performed by: INTERNAL MEDICINE

## 2025-07-01 PROCEDURE — 3074F SYST BP LT 130 MM HG: CPT | Performed by: INTERNAL MEDICINE

## 2025-07-01 PROCEDURE — 3017F COLORECTAL CA SCREEN DOC REV: CPT | Performed by: INTERNAL MEDICINE

## 2025-07-01 RX ORDER — FUROSEMIDE 40 MG/1
120 TABLET ORAL DAILY
Qty: 30 TABLET | Refills: 0 | Status: SHIPPED | OUTPATIENT
Start: 2025-07-01

## 2025-07-01 RX ORDER — ACLIDINIUM BROMIDE 400 UG/1
POWDER, METERED RESPIRATORY (INHALATION)
Qty: 1 EACH | Refills: 11 | Status: SHIPPED | OUTPATIENT
Start: 2025-07-01

## 2025-07-01 NOTE — PROGRESS NOTES
Pulmonary progress note           REASON FOR CONSULTATION:  Chief Complaint   Patient presents with    Follow-up    COPD     Bipap        Consult at request of Catherine Mims APRN - CNP     PCP: Catherine Mims APRN - CNP        Assessment and Plan:   Diagnosis Orders   1. Chronic cor pulmonale (HCC)  furosemide (LASIX) 40 MG tablet    Basic Metabolic Panel    DME Order for (Specify) as OP      2. Stage 4 very severe COPD by GOLD classification (HCC)        3. Chronic respiratory failure with hypoxia and hypercapnia (HCC)        4. Morbid obesity (HCC)                Plan:  Advised to resume Lasix at 40 mg daily, advised to check BMP in 1 week, advised to measure his weight daily and report progress to us.  Continue symbicort and Tudorza  Albuterol as needed  Advised to be more consistent with bipap therapy, 12/6  Will start azithromycin three times per week          HISTORY OF PRESENT ILLNESS: Serg Burnette is very pleasant 59 y.o. year old gentleman with medical history stated below significant for very severe copd, chronic hypoxic and hypercapnic failure here for follow up post hospitalization for exacerbation secondary to influenza   On continuous O2 @ 3 LPM  Started on bipap but has not been able to use on regular bases,   C/o worsening sob   He has gained 25 pounds over the last 3 months          Past Medical History:   Diagnosis Date    COPD (chronic obstructive pulmonary disease) (HCC)     Hyperlipidemia     Hypertension        History reviewed. No pertinent surgical history.    family history is not on file.      SOCIAL HISTORY:   reports that he has quit smoking. He has been exposed to tobacco smoke. He does not have any smokeless tobacco history on file.      ALLERGIES:  Patient is allergic to dye [barium-containing compounds].    REVIEW OF SYSTEMS:  Constitutional: Negative for fever, no wt loss, no

## 2025-08-18 ENCOUNTER — PATIENT MESSAGE (OUTPATIENT)
Dept: PULMONOLOGY | Age: 60
End: 2025-08-18

## 2025-08-18 RX ORDER — FUROSEMIDE 40 MG/1
120 TABLET ORAL DAILY
Qty: 10 TABLET | Refills: 0 | Status: SHIPPED | OUTPATIENT
Start: 2025-08-18 | End: 2026-08-18

## 2025-08-27 RX ORDER — BUDESONIDE AND FORMOTEROL FUMARATE DIHYDRATE 160; 4.5 UG/1; UG/1
2 AEROSOL RESPIRATORY (INHALATION) 2 TIMES DAILY
Qty: 10.2 G | Refills: 5 | Status: SHIPPED | OUTPATIENT
Start: 2025-08-27

## 2025-08-27 RX ORDER — ALBUTEROL SULFATE 90 UG/1
2 INHALANT RESPIRATORY (INHALATION) EVERY 4 HOURS PRN
Qty: 18 G | Refills: 5 | Status: SHIPPED | OUTPATIENT
Start: 2025-08-27